# Patient Record
Sex: MALE | Race: BLACK OR AFRICAN AMERICAN | Employment: FULL TIME | URBAN - METROPOLITAN AREA
[De-identification: names, ages, dates, MRNs, and addresses within clinical notes are randomized per-mention and may not be internally consistent; named-entity substitution may affect disease eponyms.]

---

## 2017-05-31 ENCOUNTER — LAB ONLY (OUTPATIENT)
Dept: FAMILY MEDICINE CLINIC | Age: 62
End: 2017-05-31

## 2017-05-31 DIAGNOSIS — E78.1 HYPERTRIGLYCERIDEMIA: ICD-10-CM

## 2017-05-31 DIAGNOSIS — I10 HTN (HYPERTENSION), BENIGN: Primary | ICD-10-CM

## 2017-06-01 LAB
ALT SERPL-CCNC: 14 U/L (ref 5–40)
ANION GAP SERPL CALC-SCNC: 19 MMOL/L
AST SERPL W P-5'-P-CCNC: 22 U/L (ref 10–37)
BUN SERPL-MCNC: 19 MG/DL (ref 6–22)
CALCIUM SERPL-MCNC: 10.2 MG/DL (ref 8.4–10.4)
CHLORIDE SERPL-SCNC: 95 MMOL/L (ref 98–110)
CHOLEST SERPL-MCNC: 173 MG/DL (ref 110–200)
CO2 SERPL-SCNC: 26 MMOL/L (ref 20–32)
CREAT SERPL-MCNC: 1.2 MG/DL (ref 0.8–1.6)
GFRAA, 66117: >60
GFRNA, 66118: 59.5
GLUCOSE SERPL-MCNC: 104 MG/DL (ref 65–99)
HDLC SERPL-MCNC: 38 MG/DL (ref 40–59)
LDLC SERPL CALC-MCNC: 68 MG/DL (ref 50–99)
POTASSIUM SERPL-SCNC: 5.5 MMOL/L (ref 3.5–5.5)
SODIUM SERPL-SCNC: 140 MMOL/L (ref 133–145)
TRIGL SERPL-MCNC: 331 MG/DL (ref 40–149)
VLDLC SERPL CALC-MCNC: 66 MG/DL (ref 8–30)

## 2017-06-07 ENCOUNTER — OFFICE VISIT (OUTPATIENT)
Dept: FAMILY MEDICINE CLINIC | Age: 62
End: 2017-06-07

## 2017-06-07 ENCOUNTER — DOCUMENTATION ONLY (OUTPATIENT)
Dept: FAMILY MEDICINE CLINIC | Age: 62
End: 2017-06-07

## 2017-06-07 VITALS
HEART RATE: 81 BPM | BODY MASS INDEX: 31.23 KG/M2 | RESPIRATION RATE: 16 BRPM | OXYGEN SATURATION: 99 % | TEMPERATURE: 98.3 F | DIASTOLIC BLOOD PRESSURE: 82 MMHG | SYSTOLIC BLOOD PRESSURE: 124 MMHG | HEIGHT: 67 IN | WEIGHT: 199 LBS

## 2017-06-07 DIAGNOSIS — J30.1 SEASONAL ALLERGIC RHINITIS DUE TO POLLEN: ICD-10-CM

## 2017-06-07 DIAGNOSIS — E78.1 HYPERTRIGLYCERIDEMIA: ICD-10-CM

## 2017-06-07 DIAGNOSIS — I10 HTN (HYPERTENSION), BENIGN: Primary | ICD-10-CM

## 2017-06-07 RX ORDER — LORATADINE 10 MG/1
10 TABLET ORAL DAILY
Qty: 30 TAB | Refills: 0 | COMMUNITY
Start: 2017-06-07 | End: 2017-10-24 | Stop reason: ALTCHOICE

## 2017-06-07 RX ORDER — FENOFIBRATE 145 MG/1
TABLET, COATED ORAL
Qty: 90 TAB | Refills: 3 | Status: SHIPPED | OUTPATIENT
Start: 2017-06-07 | End: 2018-06-11 | Stop reason: SDUPTHER

## 2017-06-07 RX ORDER — LOSARTAN POTASSIUM AND HYDROCHLOROTHIAZIDE 25; 100 MG/1; MG/1
TABLET ORAL
Qty: 90 TAB | Refills: 3 | Status: SHIPPED | OUTPATIENT
Start: 2017-06-07 | End: 2018-06-11 | Stop reason: SDUPTHER

## 2017-06-07 NOTE — PROGRESS NOTES
Patient dropped off Hemoccult II Sensa Patient Kit containing 3 test cards dated 06/05/2017, 06/06/2017, and 06/07/2017. All three test cards were negative.

## 2017-06-07 NOTE — PROGRESS NOTES
1. Have you been to the ER, urgent care clinic since your last visit? Hospitalized since your last visit? No    2. Have you seen or consulted any other health care providers outside of the [address] [place] [address] since your last visit? Include any pap smears or colon screening.  No

## 2017-06-07 NOTE — PATIENT INSTRUCTIONS

## 2017-06-07 NOTE — MR AVS SNAPSHOT
Visit Information Date & Time Provider Department Dept. Phone Encounter #  
 6/7/2017  8:00 AM [facility] [id number] [phone] Follow-up Instructions Return in about 4 months (around 10/7/2017) for triglyceride. Your Appointments 11/16/2017  9:00 AM  
ULTRASOUND with Jaqui Foster MD  
[facility] [address] [facility]) Appt Note: PVR/PSA  
 [address] [address] [address]  
[phone] [address] 600 [facility] [address] Upcoming Health Maintenance Date Due ZOSTER VACCINE AGE 60> 1/13/2017 INFLUENZA AGE 9 TO ADULT 8/1/2017 COLONOSCOPY 6/5/2019 DTaP/Tdap/Td series (2 - Td) 7/5/2026 Allergies as of 6/7/2017  Review Complete On: 6/7/2017 By: Timoteo Salazar LPN No Known Allergies Current Immunizations  Never Reviewed Name Date Influenza Vaccine (Quad) PF 12/8/2016, 9/2/2015, 10/1/2014 Influenza Vaccine PF 9/30/2013 Influenza Vaccine Split 10/26/2011 Tdap 7/5/2016 Not reviewed this visit You Were Diagnosed With   
  
 Codes Comments HTN (hypertension), benign    -  Primary ICD-10-CM: I10 
ICD-9-CM: 401.1 Hypertriglyceridemia     ICD-10-CM: E78.1 ICD-9-CM: 272.1 Seasonal allergic rhinitis due to pollen     ICD-10-CM: J30.1 ICD-9-CM: 477.0 Vitals BP Pulse Temp Resp Height(growth percentile) Weight(growth percentile) 124/82 (BP 1 Location: Left arm, BP Patient Position: Sitting) 81 98.3 °F (36.8 °C) (Oral) 16 5' 7\" (1.702 m) 199 lb (90.3 kg) SpO2 BMI Smoking Status 99% 31.17 kg/m2 Never Smoker BMI and BSA Data Body Mass Index Body Surface Area  
 31.17 kg/m 2 2.07 m 2 Preferred Pharmacy Pharmacy Name Phone [id number]  [address], [address] [phone] Your Updated Medication List  
  
   
 This list is accurate as of: 6/7/17  8:27 AM.  Always use your most recent med list.  
  
  
  
  
 aspirin delayed-release 81 mg tablet Take 81 mg by mouth daily. CLARITIN 10 mg tablet Generic drug:  loratadine Take 1 Tab by mouth daily. cyclobenzaprine 5 mg tablet Commonly known as:  FLEXERIL Take 1 Tab by mouth three (3) times daily as needed for Muscle Spasm(s). As directed. fenofibrate nanocrystallized 145 mg tablet Commonly known as:  TRICOR  
take 1 tablet by mouth daily  
  
 ibuprofen 200 mg tablet Commonly known as:  MOTRIN Take  by mouth.  
  
 losartan-hydroCHLOROthiazide 100-25 mg per tablet Commonly known as:  HYZAAR  
take 1 tablet by mouth daily  
  
 timolol 0.5 % ophthalmic gel-forming Commonly known as:  TIMOPTIC-XE  
  
  
  
  
Prescriptions Sent to Pharmacy Refills  
 losartan-hydroCHLOROthiazide (HYZAAR) 100-25 mg per tablet 3 Sig: take 1 tablet by mouth daily Class: Normal  
 Pharmacy: [facility] [address] [place] [address] Ph #: [phone]  
 fenofibrate nanocrystallized (TRICOR) 145 mg tablet 3 Sig: take 1 tablet by mouth daily Class: Normal  
 Pharmacy: [facility] [address] [place] [address] Ph #: [phone] Follow-up Instructions Return in about 4 months (around 10/7/2017) for triglyceride. Patient Instructions Allergies: Care Instructions Your Care Instructions Allergies occur when your body's defense system (immune system) overreacts to certain substances. The immune system treats a harmless substance as if it were a harmful germ or virus. Many things can cause this overreaction, including pollens, medicine, food, dust, animal dander, and mold. Allergies can be mild or severe. Mild allergies can be managed with home treatment. But medicine may be needed to prevent problems. Managing your allergies is an important part of staying healthy.  Your doctor may suggest that you have allergy testing to help find out what is causing your allergies. When you know what things trigger your symptoms, you can avoid them. This can prevent allergy symptoms and other health problems. For severe allergies that cause reactions that affect your whole body (anaphylactic reactions), your doctor may prescribe a shot of epinephrine to carry with you in case you have a severe reaction. Learn how to give yourself the shot and keep it with you at all times. Make sure it is not . Follow-up care is a key part of your treatment and safety. Be sure to make and go to all appointments, and call your doctor if you are having problems. It's also a good idea to know your test results and keep a list of the medicines you take. How can you care for yourself at home? · If you have been told by your doctor that dust or dust mites are causing your allergy, decrease the dust around your bed: 
[facility] AUTHOR[employer] sheets, pillowcases, and other bedding in hot water every week. ¨ Use dust-proof covers for pillows, duvets, and mattresses. Avoid plastic covers because they tear easily and do not \"breathe. \" Wash as instructed on the label. ¨ Do not use any blankets and pillows that you do not need. ¨ Use blankets that you can wash in your washing machine. ¨ Consider removing drapes and carpets, which attract and hold dust, from your bedroom. · If you are allergic to house dust and mites, do not use home humidifiers. Your doctor can suggest ways you can control dust and mites. · Look for signs of cockroaches. Cockroaches cause allergic reactions. Use cockroach baits to get rid of them. Then, clean your home well. Cockroaches like areas where grocery bags, newspapers, empty bottles, or cardboard boxes are stored. Do not keep these inside your home, and keep trash and food containers sealed. Seal off any spots where cockroaches might enter your home. · If you are allergic to mold, get rid of furniture, rugs, and drapes that smell musty. Check for mold in the bathroom. · If you are allergic to outdoor pollen or mold spores, use air-conditioning. Change or clean all filters every month. Keep windows closed. · If you are allergic to pollen, stay inside when pollen counts are high. Use a vacuum  with a HEPA filter or a double-thickness filter at least two times each week. · Stay inside when air pollution is bad. Avoid paint fumes, perfumes, and other strong odors. · Avoid conditions that make your allergies worse. Stay away from smoke. Do not smoke or let anyone else smoke in your house. Do not use fireplaces or wood-burning stoves. · If you are allergic to your pets, change the air filter in your furnace every month. Use high-efficiency filters. · If you are allergic to pet dander, keep pets outside or out of your bedroom. Old carpet and cloth furniture can hold a lot of animal dander. You may need to replace them. When should you call for help? Give an epinephrine shot if: 
· You think you are having a severe allergic reaction. · You have symptoms in more than one body area, such as mild nausea and an itchy mouth. After giving an epinephrine shot call 911, even if you feel better. Call 911 if: 
· You have symptoms of a severe allergic reaction. These may include: 
¨ Sudden raised, red areas (hives) all over your body. ¨ Swelling of the throat, mouth, lips, or tongue. ¨ Trouble breathing. ¨ Passing out (losing consciousness). Or you may feel very lightheaded or suddenly feel weak, confused, or restless. · You have been given an epinephrine shot, even if you feel better. Call your doctor now or seek immediate medical care if: 
· You have symptoms of an allergic reaction, such as: ¨ A rash or hives (raised, red areas on the skin). ¨ Itching. ¨ Swelling. ¨ Belly pain, nausea, or vomiting. Watch closely for changes in your health, and be sure to contact your doctor if: 
· You do not get better as expected. Where can you learn more? Go to http://marsha-porfirio.info/. Enter C168 in the search box to learn more about \"Allergies: Care Instructions. \" Current as of: February 12, 2016 Content Version: 11.2 © 1183-8228 [employer]. Care instructions adapted under license by [employer] (which disclaims liability or warranty for this information). If you have questions about a medical condition or this instruction, always ask your healthcare professional. [address] any warranty or liability for your use of this information. Introducing [facility] & HEALTH SERVICES! Dear Freddy Pittman: Thank you for requesting a GameLayers account. Our records indicate that you already have an active GameLayers account. You can access your account anytime at [email or web address]://"[email or web address] [email or web address]". [employer]/"Tixie [employer]" Did you know that you can access your hospital and ER discharge instructions at any time in GameLayers? You can also review all of your test results from your hospital stay or ER visit. Additional Information If you have questions, please visit the Frequently Asked Questions section of the [employer] website at [email or web address]://"[email or web address] [employer]". [employer]/"T[employer]ie [employer]"/. Remember, GameLayers is NOT to be used for urgent needs. For medical emergencies, dial 911. Now available from your iPhone and Android! Please provide this summary of care documentation to your next provider. Your primary care clinician is listed as [address]. If you have any questions after today's visit, please call [phone].

## 2017-06-07 NOTE — PROGRESS NOTES
HISTORY OF PRESENT ILLNESS  Liliana Justin is a 63 y.o. male. HPI  Patient is here today for evaluation and treatment of: Hypertension/Cholesterol problem    Hypertension: he is on hyzaar;  Needs a refill. BP is stable today. Had a neg CT of head recently. States he at times still has a pain in his head;  Has no worsening sx. 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 States that he has had some dry cough and itchy throat for the last week and  1/2. No fever, + sneezing. Current Outpatient Prescriptions:     ibuprofen (MOTRIN) 200 mg tablet, Take  by mouth., Disp: , Rfl:     losartan-hydrochlorothiazide (HYZAAR) 100-25 mg per tablet, take 1 tablet by mouth daily, Disp: 90 Tab, Rfl: 3    fenofibrate nanocrystallized (TRICOR) 145 mg tablet, take 1 tablet by mouth daily, Disp: 90 Tab, Rfl: 3    timolol (TIMOPTIC-XE) 0.5 % ophthalmic gel-forming, , Disp: , Rfl: 6    aspirin delayed-release 81 mg tablet, Take 81 mg by mouth daily. , Disp: , Rfl:     cyclobenzaprine (FLEXERIL) 5 mg tablet, Take 1 Tab by mouth three (3) times daily as needed for Muscle Spasm(s). As directed., Disp: 30 Tab, Rfl: 0     PMH,  Meds, Allergies, Family History, Social history reviewed      Review of Systems   Constitutional: Negative for chills and fever. Cardiovascular: Negative for chest pain and palpitations. Physical Exam   HENT:   Right Ear: Tympanic membrane normal.   Left Ear: Tympanic membrane normal.   Nose: No mucosal edema. Right sinus exhibits no maxillary sinus tenderness and no frontal sinus tenderness. Left sinus exhibits no maxillary sinus tenderness and no frontal sinus tenderness. Mouth/Throat: No oropharyngeal exudate.       Visit Vitals    /82 (BP 1 Location: Left arm, BP Patient Position: Sitting)    Pulse 81    Temp 98.3 °F (36.8 °C) (Oral)    Resp 16    Ht 5' 7\" (1.702 m)    Wt 199 lb (90.3 kg)    SpO2 99%    BMI 31.17 kg/m2     General appearance: alert, cooperative, no distress, appears stated age  Neck: supple, symmetrical, trachea midline, no adenopathy, thyroid: not enlarged, symmetric, no tenderness/mass/nodules, no carotid bruit and no JVD  Lungs: clear to auscultation bilaterally  Heart: regular rate and rhythm, S1, S2 normal, no murmur, click, rub or gallop  Extremities: extremities normal, atraumatic, no cyanosis or edema  Lab Results   Component Value Date/Time    Cholesterol, total 173 05/31/2017 09:51 AM    HDL Cholesterol 38 05/31/2017 09:51 AM    LDL, calculated 68 05/31/2017 09:51 AM    VLDL, calculated 66 05/31/2017 09:51 AM    Triglyceride 331 05/31/2017 09:51 AM    CHOL/HDL Ratio 4.1 08/04/2010 04:45 PM     Lab Results   Component Value Date/Time    Sodium 140 05/31/2017 09:51 AM    Potassium 5.5 05/31/2017 09:51 AM    Chloride 95 05/31/2017 09:51 AM    CO2 26 05/31/2017 09:51 AM    Anion gap 19.0 05/31/2017 09:51 AM    Glucose 104 05/31/2017 09:51 AM    BUN 19 05/31/2017 09:51 AM    Creatinine 1.2 05/31/2017 09:51 AM    BUN/Creatinine ratio 15 10/26/2011 10:28 AM    GFR est AA 76 10/26/2011 10:28 AM    GFR est non-AA 66 10/26/2011 10:28 AM    Calcium 10.2 05/31/2017 09:51 AM         ASSESSMENT and PLAN    ICD-10-CM ICD-9-CM    1. HTN (hypertension), benign I10 401.1    2. Hypertriglyceridemia E78.1 272.1    3. Seasonal allergic rhinitis due to pollen J30.1 477.0 loratadine (CLARITIN) 10 mg tablet       As above,   above all stable unless otherwise noted  Labs as ordered  Continue current meds as ordered  Advised claritin OTC as per record for probable allergic rhinitis. Follow-up Disposition:  Return in about 4 months (around 10/7/2017) for triglyceride. An After Visit Summary was printed and given to the patient. This has been fully explained to the patient, who indicates understanding.

## 2017-10-24 ENCOUNTER — OFFICE VISIT (OUTPATIENT)
Dept: FAMILY MEDICINE CLINIC | Age: 62
End: 2017-10-24

## 2017-10-24 VITALS
BODY MASS INDEX: 31.08 KG/M2 | WEIGHT: 198 LBS | OXYGEN SATURATION: 95 % | TEMPERATURE: 98.6 F | HEART RATE: 72 BPM | DIASTOLIC BLOOD PRESSURE: 84 MMHG | RESPIRATION RATE: 18 BRPM | HEIGHT: 67 IN | SYSTOLIC BLOOD PRESSURE: 130 MMHG

## 2017-10-24 DIAGNOSIS — I10 ESSENTIAL HYPERTENSION: Primary | ICD-10-CM

## 2017-10-24 DIAGNOSIS — Z23 ENCOUNTER FOR IMMUNIZATION: ICD-10-CM

## 2017-10-24 DIAGNOSIS — E78.1 HYPERTRIGLYCERIDEMIA: ICD-10-CM

## 2017-10-24 NOTE — MR AVS SNAPSHOT
Visit Information Date & Time Provider Department Dept. Phone Encounter #  
 10/24/2017  8:40 AM Joey Wyatt, [address] [place] [address] [place] [facility] [id number] Follow-up Instructions Return in about 6 months (around 4/24/2018) for physical.  
  
Your Appointments 11/16/2017  9:00 AM  
ULTRASOUND with Todd Jennings, MD  
[facility] [address] [facility]) Appt Note: PVR/PSA  
 [address] [place] [address] [address] [phone] [address] 600 [facility] [address] Upcoming Health Maintenance Date Due ZOSTER VACCINE AGE 60> 11/13/2016 INFLUENZA AGE 9 TO ADULT 8/1/2017 COLONOSCOPY 6/5/2019 DTaP/Tdap/Td series (2 - Td) 7/5/2026 Allergies as of 10/24/2017  Review Complete On: 10/24/2017 By: Eddie Carrion LPN No Known Allergies Current Immunizations  Never Reviewed Name Date Influenza Vaccine (Quad) PF 10/24/2017, 12/8/2016, 9/2/2015, 10/1/2014 Influenza Vaccine PF 9/30/2013 Influenza Vaccine Split 10/26/2011 Tdap 7/5/2016 Not reviewed this visit You Were Diagnosed With   
  
 Codes Comments Essential hypertension    -  Primary ICD-10-CM: I10 
ICD-9-CM: 401.9 Hypertriglyceridemia     ICD-10-CM: E78.1 ICD-9-CM: 272.1 Encounter for immunization     ICD-10-CM: G42 ICD-9-CM: V03.89 Vitals BP Pulse Temp Resp Height(growth percentile) Weight(growth percentile) 130/84 (BP 1 Location: Left arm, BP Patient Position: Sitting) 72 98.6 °F (37 °C) (Oral) 18 5' 7\" (1.702 m) 198 lb (89.8 kg) SpO2 BMI Smoking Status 95% 31.01 kg/m2 Never Smoker Vitals History BMI and BSA Data Body Mass Index Body Surface Area 31.01 kg/m 2 2.06 m 2 Preferred Pharmacy Pharmacy Name Phone [id number], [address] [place] [address] Ave [phone] Your Updated Medication List  
  
   
 This list is accurate as of: 10/24/17  9:36 AM.  Always use your most recent med list.  
  
  
  
  
 aspirin delayed-release 81 mg tablet Take 81 mg by mouth daily. fenofibrate nanocrystallized 145 mg tablet Commonly known as:  TRICOR  
take 1 tablet by mouth daily  
  
 losartan-hydroCHLOROthiazide 100-25 mg per tablet Commonly known as:  HYZAAR  
take 1 tablet by mouth daily  
  
 timolol 0.5 % ophthalmic gel-forming Commonly known as:  TIMOPTIC-XE We Performed the Following INFLUENZA VIRUS VAC QUAD,SPLIT,PRESV FREE SYRINGE IM [id number] CPT(R)] Follow-up Instructions Return in about 6 months (around 4/24/2018) for physical.  
  
To-Do List   
 10/31/2017 Lab:  ALT   
  
 10/31/2017 Lab:  AST   
  
 10/31/2017 Lab:  LIPID PANEL   
  
 10/31/2017 Lab:  METABOLIC PANEL, BASIC Patient Instructions High Cholesterol: Care Instructions Your Care Instructions Cholesterol is a type of fat in your blood. It is needed for many body functions, such as making new cells. Cholesterol is made by your body. It also comes from food you eat. High cholesterol means that you have too much of the fat in your blood. This raises your risk of a heart attack and stroke. LDL and HDL are part of your total cholesterol. LDL is the \"bad\" cholesterol. High LDL can raise your risk for heart disease, heart attack, and stroke. HDL is the \"good\" cholesterol. It helps clear bad cholesterol from the body. High HDL is linked with a lower risk of heart disease, heart attack, and stroke. Your cholesterol levels help your doctor find out your risk for having a heart attack or stroke. You and your doctor can talk about whether you need to lower your risk and what treatment is best for you. A heart-healthy lifestyle along with medicines can help lower your cholesterol and your risk.  The way you choose to lower your risk will depend on how high your risk is for heart attack and stroke. It will also depend on how you feel about taking medicines. Follow-up care is a key part of your treatment and safety. Be sure to make and go to all appointments, and call your doctor if you are having problems. It's also a good idea to know your test results and keep a list of the medicines you take. How can you care for yourself at home? · Eat a variety of foods every day. Good choices include fruits, vegetables, whole grains (like oatmeal), dried beans and peas, nuts and seeds, soy products (like tofu), and fat-free or low-fat dairy products. · Replace butter, margarine, and hydrogenated or partially hydrogenated oils with olive and canola oils. (Canola oil margarine without trans fat is fine.) · Replace red meat with fish, poultry, and soy protein (like tofu). · Limit processed and packaged foods like chips, crackers, and cookies. · Bake, broil, or steam foods. Don't calvert them. · Be physically active. Get at least 30 minutes of exercise on most days of the week. Walking is a good choice. You also may want to do other activities, such as running, swimming, cycling, or playing tennis or team sports. · Stay at a healthy weight or lose weight by making the changes in eating and physical activity listed above. Losing just a small amount of weight, even 5 to 10 pounds, can reduce your risk for having a heart attack or stroke. · Do not smoke. When should you call for help? Watch closely for changes in your health, and be sure to contact your doctor if: 
· You need help making lifestyle changes. · You have questions about your medicine. Where can you learn more? Go to http://marsha-porfirio.info/. Enter Y197 in the search box to learn more about \"High Cholesterol: Care Instructions. \" Current as of: April 3, 2017 Content Version: 11.3 © 7936-3921 [employer] ([employer].  Care instructions adapted under license by [address] (which disclaims liability or warranty for this information). If you have questions about a medical condition or this instruction, always ask your healthcare professional. [address] any warranty or liability for your use of this information. Introducing [facility] & HEALTH SERVICES! Dear Joe Barrientos: Thank you for requesting a [employer] account. Our records indicate that you already have an active [employer] account. You can access your account anytime at [email or web address] Did you know that you can access your hospital and ER discharge instructions at any time in [employer]? You can also review all of your test results from your hospital stay or ER visit. Additional Information If you have questions, please visit the Frequently Asked Questions section of the [employer] website at [email or web address] Remember, [employer] is NOT to be used for urgent needs. For medical emergencies, dial 911. Now available from your iPhone and Android! Please provide this summary of care documentation to your next provider. Your primary care clinician is listed as [address]. If you have any questions after today's visit, please call [phone].

## 2017-10-24 NOTE — PROGRESS NOTES
1. Have you been to the ER, urgent care clinic since your last visit? Hospitalized since your last visit? No    2. Have you seen or consulted any other health care providers outside of the [address] [place] [address] since your last visit? Include any pap smears or colon screening.  No

## 2017-10-24 NOTE — PATIENT INSTRUCTIONS

## 2017-10-31 DIAGNOSIS — E78.1 HYPERTRIGLYCERIDEMIA: ICD-10-CM

## 2017-10-31 DIAGNOSIS — I10 ESSENTIAL HYPERTENSION: ICD-10-CM

## 2018-04-04 LAB
ALT SERPL-CCNC: 13 U/L (ref 5–40)
ANION GAP SERPL CALC-SCNC: 17 MMOL/L
AST SERPL W P-5'-P-CCNC: 26 U/L (ref 10–37)
BUN SERPL-MCNC: 15 MG/DL (ref 6–22)
CALCIUM SERPL-MCNC: 9.9 MG/DL (ref 8.4–10.4)
CHLORIDE SERPL-SCNC: 93 MMOL/L (ref 98–110)
CHOLEST SERPL-MCNC: 188 MG/DL (ref 110–200)
CO2 SERPL-SCNC: 27 MMOL/L (ref 20–32)
CREAT SERPL-MCNC: 1.1 MG/DL (ref 0.8–1.6)
GFRAA, 66117: >60
GFRNA, 66118: >60
GLUCOSE SERPL-MCNC: 110 MG/DL (ref 70–99)
HDLC SERPL-MCNC: 3.8 MG/DL (ref 0–5)
HDLC SERPL-MCNC: 49 MG/DL (ref 40–59)
LDLC SERPL CALC-MCNC: 87 MG/DL (ref 50–99)
POTASSIUM SERPL-SCNC: 4.6 MMOL/L (ref 3.5–5.5)
SODIUM SERPL-SCNC: 137 MMOL/L (ref 133–145)
TRIGL SERPL-MCNC: 262 MG/DL (ref 40–149)
VLDLC SERPL CALC-MCNC: 52 MG/DL (ref 8–30)

## 2018-04-24 ENCOUNTER — OFFICE VISIT (OUTPATIENT)
Dept: FAMILY MEDICINE CLINIC | Age: 63
End: 2018-04-24

## 2018-04-24 VITALS
TEMPERATURE: 98.5 F | SYSTOLIC BLOOD PRESSURE: 139 MMHG | HEART RATE: 90 BPM | DIASTOLIC BLOOD PRESSURE: 79 MMHG | OXYGEN SATURATION: 98 % | BODY MASS INDEX: 31.08 KG/M2 | RESPIRATION RATE: 16 BRPM | WEIGHT: 198 LBS | HEIGHT: 67 IN

## 2018-04-24 DIAGNOSIS — Z00.00 ROUTINE MEDICAL EXAM: Primary | ICD-10-CM

## 2018-04-24 NOTE — MR AVS SNAPSHOT
[address] [facility] 
 
 
 [address] [address] [address] [facility] [address] 
[phone] Patient: Kevin Jones MRN:  [id number] Visit Information Date & Time Provider Department Dept. Phone Encounter #  
 4/24/2018  8:20 AM Adalgisa Maciel, [address] [place] [address] [phone] [id number] Follow-up Instructions Return in about 4 months (around 8/24/2018) for sugar. Upcoming Health Maintenance Date Due ZOSTER VACCINE AGE 60> 8/16/2018* DTaP/Tdap/Td series (2 - Td) 7/5/2026 *Topic was postponed. The date shown is not the original due date. Allergies as of 4/24/2018  Review Complete On: 4/24/2018 By: Adalgisa Maciel MD  
 No Known Allergies Current Immunizations  Never Reviewed Name Date Influenza Vaccine (Quad) PF 10/24/2017, 12/8/2016, 9/2/2015, 10/1/2014 Influenza Vaccine PF 9/30/2013 Influenza Vaccine Split 10/26/2011 Tdap 7/5/2016 Not reviewed this visit You Were Diagnosed With   
  
 Codes Comments Routine medical exam    -  Primary ICD-10-CM: Z00.00 ICD-9-CM: V70.0 Vitals BP Pulse Temp Resp Height(growth percentile) Weight(growth percentile) 139/79 (BP 1 Location: Left arm, BP Patient Position: Sitting) 90 98.5 °F (36.9 °C) (Oral) 16 5' 7\" (1.702 m) 198 lb (89.8 kg) SpO2 BMI Smoking Status 98% 31.01 kg/m2 Never Smoker BMI and BSA Data Body Mass Index Body Surface Area 31.01 kg/m 2 2.06 m 2 Preferred Pharmacy Pharmacy Name Phone [id number], [facility] [phone] Your Updated Medication List  
  
   
This list is accurate as of 4/24/18  9:10 AM.  Always use your most recent med list.  
  
  
  
  
 aspirin delayed-release 81 mg tablet Take 81 mg by mouth daily. fenofibrate nanocrystallized 145 mg tablet Commonly known as:  TRICOR  
take 1 tablet by mouth daily losartan-hydroCHLOROthiazide 100-25 mg per tablet Commonly known as:  HYZAAR  
take 1 tablet by mouth daily  
  
 timolol 0.5 % ophthalmic gel-forming Commonly known as:  TIMOPTIC-XE Follow-up Instructions Return in about 4 months (around 8/24/2018) for sugar. Patient Instructions Well Visit, Men 48 to 72: Care Instructions Your Care Instructions Physical exams can help you stay healthy. Your doctor has checked your overall health and may have suggested ways to take good care of yourself. He or she also may have recommended tests. At home, you can help prevent illness with healthy eating, regular exercise, and other steps. Follow-up care is a key part of your treatment and safety. Be sure to make and go to all appointments, and call your doctor if you are having problems. It's also a good idea to know your test results and keep a list of the medicines you take. How can you care for yourself at home? · Reach and stay at a healthy weight. This will lower your risk for many problems, such as obesity, diabetes, heart disease, and high blood pressure. · Get at least 30 minutes of exercise on most days of the week. Walking is a good choice. You also may want to do other activities, such as running, swimming, cycling, or playing tennis or team sports. · Do not smoke. Smoking can make health problems worse. If you need help quitting, talk to your doctor about stop-smoking programs and medicines. These can increase your chances of quitting for good. · Protect your skin from too much sun. When you're outdoors from 10 a.m. to 4 p.m., stay in the shade or cover up with clothing and a hat with a wide brim. Wear sunglasses that block UV rays. Even when it's cloudy, put broad-spectrum sunscreen (SPF 30 or higher) on any exposed skin. · See a dentist one or two times a year for checkups and to have your teeth cleaned. · Wear a seat belt in the car. · Limit alcohol to 2 drinks a day. Too much alcohol can cause health problems. Follow your doctor's advice about when to have certain tests. These tests can spot problems early. · Cholesterol. Your doctor will tell you how often to have this done based on your overall health and other things that can increase your risk for heart attack and stroke. · Blood pressure. Have your blood pressure checked during a routine doctor visit. Your doctor will tell you how often to check your blood pressure based on your age, your blood pressure results, and other factors. · Prostate exam. Talk to your doctor about whether you should have a blood test (called a PSA test) for prostate cancer. Experts disagree on whether men should have this test. Some experts recommend that you discuss the benefits and risks of the test with your doctor. · Diabetes. Ask your doctor whether you should have tests for diabetes. · Vision. Some experts recommend that you have yearly exams for glaucoma and other age-related eye problems starting at age 48. · Hearing. Tell your doctor if you notice any change in your hearing. You can have tests to find out how well you hear. · Colon cancer. You should begin tests for colon cancer at age 48. You may have one of several tests. Your doctor will tell you how often to have tests based on your age and risk. Risks include whether you already had a precancerous polyp removed from your colon or whether your parent, brother, sister, or child has had colon cancer. · Heart attack and stroke risk. At least every 4 to 6 years, you should have your risk for heart attack and stroke assessed. Your doctor uses factors such as your age, blood pressure, cholesterol, and whether you smoke or have diabetes to show what your risk for a heart attack or stroke is over the next 10 years. · Abdominal aortic aneurysm.  Ask your doctor whether you should have a test to check for an aneurysm. You may need a test if you ever smoked or if your parent, brother, sister, or child has had an aneurysm. When should you call for help? Watch closely for changes in your health, and be sure to contact your doctor if you have any problems or symptoms that concern you. Where can you learn more? Go to http://marsha-porfirio.info/. Enter T205 in the search box to learn more about \"Well Visit, Men 48 to 72: Care Instructions. \" Current as of: May 12, 2017 Content Version: 11.4 © 1384-4327 [employer]. Care instructions adapted under license by [employer] (which disclaims liability or warranty for this information). If you have questions about a medical condition or this instruction, always ask your healthcare professional. [address] any warranty or liability for your use of this information. Body Mass Index: Care Instructions Your Care Instructions Body mass index (BMI) can help you see if your weight is raising your risk for health problems. It uses a formula to compare how much you weigh with how tall you are. · A BMI lower than 18.5 is considered underweight. · A BMI between 18.5 and 24.9 is considered healthy. · A BMI between 25 and 29.9 is considered overweight. A BMI of 30 or higher is considered obese. If your BMI is in the normal range, it means that you have a lower risk for weight-related health problems. If your BMI is in the overweight or obese range, you may be at increased risk for weight-related health problems, such as high blood pressure, heart disease, stroke, arthritis or joint pain, and diabetes. If your BMI is in the underweight range, you may be at increased risk for health problems such as fatigue, lower protection (immunity) against illness, muscle loss, bone loss, hair loss, and hormone problems. BMI is just one measure of your risk for weight-related health problems. You may be at higher risk for health problems if you are not active, you eat an unhealthy diet, or you drink too much alcohol or use tobacco products. Follow-up care is a key part of your treatment and safety. Be sure to make and go to all appointments, and call your doctor if you are having problems. It's also a good idea to know your test results and keep a list of the medicines you take. How can you care for yourself at home? · Practice healthy eating habits. This includes eating plenty of fruits, vegetables, whole grains, lean protein, and low-fat dairy. · If your doctor recommends it, get more exercise. Walking is a good choice. Bit by bit, increase the amount you walk every day. Try for at least 30 minutes on most days of the week. · Do not smoke. Smoking can increase your risk for health problems. If you need help quitting, talk to your doctor about stop-smoking programs and medicines. These can increase your chances of quitting for good. · Limit alcohol to 2 drinks a day for men and 1 drink a day for women. Too much alcohol can cause health problems. If you have a BMI higher than 25 · Your doctor may do other tests to check your risk for weight-related health problems. This may include measuring the distance around your waist. A waist measurement of more than 40 inches in men or 35 inches in women can increase the risk of weight-related health problems. · Talk with your doctor about steps you can take to stay healthy or improve your health. You may need to make lifestyle changes to lose weight and stay healthy, such as changing your diet and getting regular exercise. If you have a BMI lower than 18.5 · Your doctor may do other tests to check your risk for health problems. · Talk with your doctor about steps you can take to stay healthy or improve your health.  You may need to make lifestyle changes to gain or maintain weight and stay healthy, such as getting more healthy foods in your diet and doing exercises to build muscle. Where can you learn more? Go to http://marsha-porfirio.info/. Enter S176 in the search box to learn more about \"Body Mass Index: Care Instructions. \" Current as of: October 13, 2016 Content Version: 11.4 © 8244-8752 [employer]. Care instructions adapted under license by [email or web address] (which disclaims liability or warranty for this information). If you have questions about a medical condition or this instruction, always ask your healthcare professional. [address] any warranty or liability for your use of this information. Introducing \[facility] [place] [facility]\"" & HEALTH SERVICES! Dear Shahid Cox: Thank you for requesting a [employer] account. Our records indicate that you already have an active Ass[employer] account. You can access your account anytime at [email or web address] Did you know that you can access your hospital and ER discharge instructions at any time in Assistera? You can also review all of your test results from your hospital stay or ER visit. Additional Information If you have questions, please visit the Frequently Asked Questions section of the [employer] website at [email or web address] Remember, Ass[employer]a is NOT to be used for urgent needs. For medical emergencies, dial 911. Now available from your iPhone and Android! Please provide this summary of care documentation to your next provider. Your primary care clinician is listed as [address]. If you have any questions after today's visit, please call [phone].

## 2018-04-24 NOTE — PROGRESS NOTES
1. Have you been to the ER, urgent care clinic since your last visit? Hospitalized since your last visit? No    2. Have you seen or consulted any other health care providers outside of the [address] [place] [address] since your last visit? Include any pap smears or colon screening.  No

## 2018-04-24 NOTE — ACP (ADVANCE CARE PLANNING)
Advance Care Planning (ACP) Provider Conversation Snapshot    Date of ACP Conversation: 04/24/18  Persons included in Conversation:  patient  Length of ACP Conversation in minutes:  <16 minutes (Non-Billable)    Authorized Decision Maker (if patient is incapable of making informed decisions):    This person is:   NA          For Patients with Decision Making Capacity:   TBD    Conversation Outcomes / Follow-Up Plan:   Recommended completion of Advance Directive form after review of ACP materials and conversation with prospective healthcare agent   Recommended communicating the plan and making copies for the healthcare agent, personal physician, and others as appropriate (e.g., health system)

## 2018-04-24 NOTE — PATIENT INSTRUCTIONS
Well Visit, Men 48 to 72: Care Instructions  Your Care Instructions    Physical exams can help you stay healthy. Your doctor has checked your overall health and may have suggested ways to take good care of yourself. He or she also may have recommended tests. At home, you can help prevent illness with healthy eating, regular exercise, and other steps. Follow-up care is a key part of your treatment and safety. Be sure to make and go to all appointments, and call your doctor if you are having problems. It's also a good idea to know your test results and keep a list of the medicines you take. How can you care for yourself at home? · Reach and stay at a healthy weight. This will lower your risk for many problems, such as obesity, diabetes, heart disease, and high blood pressure. · Get at least 30 minutes of exercise on most days of the week. Walking is a good choice. You also may want to do other activities, such as running, swimming, cycling, or playing tennis or team sports. · Do not smoke. Smoking can make health problems worse. If you need help quitting, talk to your doctor about stop-smoking programs and medicines. These can increase your chances of quitting for good. · Protect your skin from too much sun. When you're outdoors from 10 a.m. to 4 p.m., stay in the shade or cover up with clothing and a hat with a wide brim. Wear sunglasses that block UV rays. Even when it's cloudy, put broad-spectrum sunscreen (SPF 30 or higher) on any exposed skin. · See a dentist one or two times a year for checkups and to have your teeth cleaned. · Wear a seat belt in the car. · Limit alcohol to 2 drinks a day. Too much alcohol can cause health problems. Follow your doctor's advice about when to have certain tests. These tests can spot problems early. · Cholesterol.  Your doctor will tell you how often to have this done based on your overall health and other things that can increase your risk for heart attack and stroke. · Blood pressure. Have your blood pressure checked during a routine doctor visit. Your doctor will tell you how often to check your blood pressure based on your age, your blood pressure results, and other factors. · Prostate exam. Talk to your doctor about whether you should have a blood test (called a PSA test) for prostate cancer. Experts disagree on whether men should have this test. Some experts recommend that you discuss the benefits and risks of the test with your doctor. · Diabetes. Ask your doctor whether you should have tests for diabetes. · Vision. Some experts recommend that you have yearly exams for glaucoma and other age-related eye problems starting at age 48. · Hearing. Tell your doctor if you notice any change in your hearing. You can have tests to find out how well you hear. · Colon cancer. You should begin tests for colon cancer at age 48. You may have one of several tests. Your doctor will tell you how often to have tests based on your age and risk. Risks include whether you already had a precancerous polyp removed from your colon or whether your parent, brother, sister, or child has had colon cancer. · Heart attack and stroke risk. At least every 4 to 6 years, you should have your risk for heart attack and stroke assessed. Your doctor uses factors such as your age, blood pressure, cholesterol, and whether you smoke or have diabetes to show what your risk for a heart attack or stroke is over the next 10 years. · Abdominal aortic aneurysm. Ask your doctor whether you should have a test to check for an aneurysm. You may need a test if you ever smoked or if your parent, brother, sister, or child has had an aneurysm. When should you call for help? Watch closely for changes in your health, and be sure to contact your doctor if you have any problems or symptoms that concern you. Where can you learn more? Go to http://marsha-porfirio.info/.   Enter L337 in the search box to learn more about \"Well Visit, Men 48 to 72: Care Instructions. \"  Current as of: May 12, 2017  Content Version: 11.4  © 9156-9488 [employer]. Care instructions adapted under license by [employer] (which disclaims liability or warranty for this information). If you have questions about a medical condition or this instruction, always ask your healthcare professional. [address] any warranty or liability for your use of this information. Body Mass Index: Care Instructions  Your Care Instructions    Body mass index (BMI) can help you see if your weight is raising your risk for health problems. It uses a formula to compare how much you weigh with how tall you are. · A BMI lower than 18.5 is considered underweight. · A BMI between 18.5 and 24.9 is considered healthy. · A BMI between 25 and 29.9 is considered overweight. A BMI of 30 or higher is considered obese. If your BMI is in the normal range, it means that you have a lower risk for weight-related health problems. If your BMI is in the overweight or obese range, you may be at increased risk for weight-related health problems, such as high blood pressure, heart disease, stroke, arthritis or joint pain, and diabetes. If your BMI is in the underweight range, you may be at increased risk for health problems such as fatigue, lower protection (immunity) against illness, muscle loss, bone loss, hair loss, and hormone problems. BMI is just one measure of your risk for weight-related health problems. You may be at higher risk for health problems if you are not active, you eat an unhealthy diet, or you drink too much alcohol or use tobacco products. Follow-up care is a key part of your treatment and safety. Be sure to make and go to all appointments, and call your doctor if you are having problems. It's also a good idea to know your test results and keep a list of the medicines you take.   How can you care for yourself at home? · Practice healthy eating habits. This includes eating plenty of fruits, vegetables, whole grains, lean protein, and low-fat dairy. · If your doctor recommends it, get more exercise. Walking is a good choice. Bit by bit, increase the amount you walk every day. Try for at least 30 minutes on most days of the week. · Do not smoke. Smoking can increase your risk for health problems. If you need help quitting, talk to your doctor about stop-smoking programs and medicines. These can increase your chances of quitting for good. · Limit alcohol to 2 drinks a day for men and 1 drink a day for women. Too much alcohol can cause health problems. If you have a BMI higher than 25  · Your doctor may do other tests to check your risk for weight-related health problems. This may include measuring the distance around your waist. A waist measurement of more than 40 inches in men or 35 inches in women can increase the risk of weight-related health problems. · Talk with your doctor about steps you can take to stay healthy or improve your health. You may need to make lifestyle changes to lose weight and stay healthy, such as changing your diet and getting regular exercise. If you have a BMI lower than 18.5  · Your doctor may do other tests to check your risk for health problems. · Talk with your doctor about steps you can take to stay healthy or improve your health. You may need to make lifestyle changes to gain or maintain weight and stay healthy, such as getting more healthy foods in your diet and doing exercises to build muscle. Where can you learn more? Go to http://marsha-porfirio.info/. Enter S176 in the search box to learn more about \"Body Mass Index: Care Instructions. \"  Current as of: October 13, 2016  Content Version: 11.4  © 5793-3628 [employer]wise[employer].  Care instructions adapted under license by [employer] (which disclaims liability or warranty for this information). If you have questions about a medical condition or this instruction, always ask your healthcare professional. [address] [address] any warranty or liability for your use of this information.

## 2018-04-24 NOTE — PROGRESS NOTES
Subjective:     Juliette Henning is a 66 y.o. male presenting for annual exam and complete physical.    States that his left ear feels clogged often    Also states he gets a sharp pain at times at the left temple region; Had a neg Head CT in 2016. Missed urology appointment in November ; plans to reschedule. Patient Active Problem List    Diagnosis Date Noted    Unspecified hyperplasia of prostate with urinary obstruction and other lower urinary tract symptoms (LUTS)     HTN (hypertension), benign 01/11/2010    Allergic rhinitis 01/11/2010    Glaucoma 01/11/2010    Hypertriglyceridemia 01/11/2010     Current Outpatient Prescriptions   Medication Sig Dispense Refill    losartan-hydroCHLOROthiazide (HYZAAR) 100-25 mg per tablet take 1 tablet by mouth daily 90 Tab 3    fenofibrate nanocrystallized (TRICOR) 145 mg tablet take 1 tablet by mouth daily 90 Tab 3    timolol (TIMOPTIC-XE) 0.5 % ophthalmic gel-forming   6    aspirin delayed-release 81 mg tablet Take 81 mg by mouth daily.        No Known Allergies  Past Medical History:   Diagnosis Date    Allergic rhinitis     environmental    Allergic rhinitis 1/11/2010    Arthritis     neck, shoulder/ chiropractor    Essential hypertension, benign     Glaucoma 1/11/2010    Glaucoma 1/11/2010    HTN (hypertension), benign 1/11/2010    Hypertriglyceridemia      Past Surgical History:   Procedure Laterality Date    COLONOSCOPY  11/07    due Nov 2012    HX HERNIA REPAIR  1975    left groin     Family History   Problem Relation Age of Onset    Diabetes Mother     Cancer Mother      bone ca    Diabetes Father      Social History   Substance Use Topics    Smoking status: Never Smoker    Smokeless tobacco: Never Used    Alcohol use Yes      Comment: occ beer        Lab Results   Component Value Date/Time    WBC 6.9 01/25/2011 08:25 AM    HGB 14.2 01/25/2011 08:25 AM    HCT 42.5 01/25/2011 08:25 AM    PLATELET 707 65/88/9576 08:25 AM    MCV 91 01/25/2011 08:25 AM     Lab Results   Component Value Date/Time    Cholesterol, total 188 04/03/2018 08:10 AM    HDL Cholesterol 49 04/03/2018 08:10 AM    LDL, calculated 87 04/03/2018 08:10 AM    Triglyceride 262 (H) 04/03/2018 08:10 AM    CHOL/HDL Ratio 4.1 08/04/2010 04:45 PM     Lab Results   Component Value Date/Time    ALT (SGPT) 13 04/03/2018 08:10 AM    AST (SGOT) 26 04/03/2018 08:10 AM    Alk. phosphatase 46 01/25/2011 08:25 AM    Bilirubin, total 0.3 01/25/2011 08:25 AM    Albumin 4.4 01/25/2011 08:25 AM    Protein, total 6.9 01/25/2011 08:25 AM    PLATELET 795 63/85/6122 08:25 AM       Lab Results   Component Value Date/Time    GFR est non-AA 66 10/26/2011 10:28 AM    GFR est AA 76 10/26/2011 10:28 AM    Creatinine 1.1 04/03/2018 08:10 AM    BUN 15 04/03/2018 08:10 AM    Sodium 137 04/03/2018 08:10 AM    Potassium 4.6 04/03/2018 08:10 AM    Chloride 93 (L) 04/03/2018 08:10 AM    CO2 27 04/03/2018 08:10 AM     Lab Results   Component Value Date/Time    Prostate Specific Ag 0.576 11/17/2016 09:25 AM    Prostate Specific Ag 0.781 11/17/2015 09:00 AM    Prostate Specific Ag 0.6 11/15/2011 12:55 PM    Prostate Specific Ag 0.6 05/05/2010 08:09 AM    Prostate Specific Ag 0.5 01/11/2010 10:03 AM        Review of Systems  A comprehensive review of systems was negative except for that written in the HPI.     Objective:     Visit Vitals    /79 (BP 1 Location: Left arm, BP Patient Position: Sitting)    Pulse 90    Temp 98.5 °F (36.9 °C) (Oral)    Resp 16    Ht 5' 7\" (1.702 m)    Wt 198 lb (89.8 kg)    SpO2 98%    BMI 31.01 kg/m2     Physical exam:   General appearance - alert, well appearing, and in no distress  Ears - bilateral TM's and external ear ; left canal normal; right ear canal obscured with cerumen  Nose - normal and patent, no erythema, discharge or polyps  Mouth - mucous membranes moist, pharynx normal without lesions  Neck - supple, no significant adenopathy  Lymphatics - no palpable lymphadenopathy, no hepatosplenomegaly  Chest - clear to auscultation, no wheezes, rales or rhonchi, symmetric air entry  Heart - normal rate, regular rhythm, normal S1, S2, no murmurs, rubs, clicks or gallops  Abdomen - soft, nontender, nondistended, no masses or organomegaly  Musculoskeletal - no joint tenderness, deformity or swelling  Extremities - no pedal edema noted  Skin - normal coloration and turgor, no rashes, no suspicious skin lesions noted     Lab Results   Component Value Date/Time    Cholesterol, total 188 04/03/2018 08:10 AM    HDL Cholesterol 49 04/03/2018 08:10 AM    LDL, calculated 87 04/03/2018 08:10 AM    VLDL, calculated 52 (H) 04/03/2018 08:10 AM    Triglyceride 262 (H) 04/03/2018 08:10 AM    CHOL/HDL Ratio 4.1 08/04/2010 04:45 PM     Lab Results   Component Value Date/Time    Cholesterol, total 188 04/03/2018 08:10 AM    HDL Cholesterol 49 04/03/2018 08:10 AM    LDL, calculated 87 04/03/2018 08:10 AM    VLDL, calculated 52 (H) 04/03/2018 08:10 AM    Triglyceride 262 (H) 04/03/2018 08:10 AM    CHOL/HDL Ratio 4.1 08/04/2010 04:45 PM     Lab Results   Component Value Date/Time    Sodium 137 04/03/2018 08:10 AM    Potassium 4.6 04/03/2018 08:10 AM    Chloride 93 (L) 04/03/2018 08:10 AM    CO2 27 04/03/2018 08:10 AM    Anion gap 17.0 04/03/2018 08:10 AM    Glucose 110 (H) 04/03/2018 08:10 AM    BUN 15 04/03/2018 08:10 AM    Creatinine 1.1 04/03/2018 08:10 AM    BUN/Creatinine ratio 15 10/26/2011 10:28 AM    GFR est AA 76 10/26/2011 10:28 AM    GFR est non-AA 66 10/26/2011 10:28 AM    Calcium 9.9 04/03/2018 08:10 AM    Bilirubin, total 0.3 01/25/2011 08:25 AM    AST (SGOT) 26 04/03/2018 08:10 AM    Alk.  phosphatase 46 01/25/2011 08:25 AM    Protein, total 6.9 01/25/2011 08:25 AM    Albumin 4.4 01/25/2011 08:25 AM    Globulin 3.3 08/04/2010 04:45 PM    A-G Ratio 1.8 01/25/2011 08:25 AM    ALT (SGPT) 13 04/03/2018 08:10 AM     Lab Results   Component Value Date/Time    Hemoglobin A1c 5.5 03/02/2015 08:58 AM     Lab Results   Component Value Date/Time    Prostate Specific Ag 0.576 11/17/2016 09:25 AM    Prostate Specific Ag 0.781 11/17/2015 09:00 AM    Prostate Specific Ag 0.6 11/15/2011 12:55 PM    Prostate Specific Ag 0.6 05/05/2010 08:09 AM    Prostate Specific Ag 0.5 01/11/2010 10:03 AM           Assessment/Plan:       increase physical activity, follow low fat diet, follow low salt diet, continue present plan, routine labs ordered. ICD-10-CM ICD-9-CM    1. Routine medical exam Z00.00 V70.0    . As above,   above all stable unless otherwise noted  Labs as ordered  Continue current meds as ordered  Advised healthy diet and exercise as tolerated;  BMI reviewed  Follow-up Disposition:  Return in about 4 months (around 8/24/2018) for sugar. An After Visit Summary was printed and given to the patient. This has been fully explained to the patient, who indicates understanding. OTC cerumen

## 2018-06-11 NOTE — TELEPHONE ENCOUNTER
Pt called in requesting refill of his   Requested Prescriptions     Pending Prescriptions Disp Refills    losartan-hydroCHLOROthiazide (HYZAAR) 100-25 mg per tablet 90 Tab 3     Sig: take 1 tablet by mouth daily    fenofibrate nanocrystallized (TRICOR) 145 mg tablet 90 Tab 3     Sig: take 1 tablet by mouth daily   .

## 2018-06-13 RX ORDER — FENOFIBRATE 145 MG/1
TABLET, COATED ORAL
Qty: 90 TAB | Refills: 3 | Status: SHIPPED | OUTPATIENT
Start: 2018-06-13 | End: 2018-08-23 | Stop reason: SDUPTHER

## 2018-06-13 RX ORDER — LOSARTAN POTASSIUM AND HYDROCHLOROTHIAZIDE 25; 100 MG/1; MG/1
TABLET ORAL
Qty: 90 TAB | Refills: 3 | Status: SHIPPED | OUTPATIENT
Start: 2018-06-13 | End: 2018-08-23 | Stop reason: SDUPTHER

## 2018-06-14 RX ORDER — FENOFIBRATE 145 MG/1
TABLET, COATED ORAL
Qty: 90 TAB | Refills: 3 | Status: SHIPPED | OUTPATIENT
Start: 2018-06-14 | End: 2019-06-06 | Stop reason: SDUPTHER

## 2018-06-14 RX ORDER — LOSARTAN POTASSIUM AND HYDROCHLOROTHIAZIDE 25; 100 MG/1; MG/1
TABLET ORAL
Qty: 90 TAB | Refills: 3 | Status: SHIPPED | OUTPATIENT
Start: 2018-06-14 | End: 2018-12-19 | Stop reason: SDUPTHER

## 2018-07-01 NOTE — PROGRESS NOTES
HISTORY OF PRESENT ILLNESS  Luz Sawyer is a 63 y.o. male. HPI   Patient is here today for evaluation and treatment of; Cholesterol. Cholesterol: has a h/o hypertriglyceridemia: on tricor; tolertaes med well and is compliant ; He is nonfasting today. Pt is on hyzaar for HTN    Has aches and pains; he has a physical job. Current Outpatient Prescriptions:     losartan-hydroCHLOROthiazide (HYZAAR) 100-25 mg per tablet, take 1 tablet by mouth daily, Disp: 90 Tab, Rfl: 3    fenofibrate nanocrystallized (TRICOR) 145 mg tablet, take 1 tablet by mouth daily, Disp: 90 Tab, Rfl: 3    timolol (TIMOPTIC-XE) 0.5 % ophthalmic gel-forming, , Disp: , Rfl: 6    aspirin delayed-release 81 mg tablet, Take 81 mg by mouth daily. , Disp: , Rfl:     Review of Systems   Constitutional: Negative for chills and fever. Cardiovascular: Negative for chest pain, palpitations and leg swelling. Musculoskeletal: Positive for myalgias.         Aches and pains, neck and shoulder       Physical Exam   Visit Vitals    /84 (BP 1 Location: Left arm, BP Patient Position: Sitting)    Pulse 72    Temp 98.6 °F (37 °C) (Oral)    Resp 18    Ht 5' 7\" (1.702 m)    Wt 198 lb (89.8 kg)    SpO2 95%    BMI 31.01 kg/m2     General appearance: alert, cooperative, no distress, appears stated age  Neck: supple, symmetrical, trachea midline, no adenopathy, thyroid: not enlarged, symmetric, no tenderness/mass/nodules, no carotid bruit and no JVD  Lungs: clear to auscultation bilaterally  Heart: regular rate and rhythm, S1, S2 normal, no murmur, click, rub or gallop  Extremities: extremities normal, atraumatic, no cyanosis or edema  Lab Results   Component Value Date/Time    Cholesterol, total 173 05/31/2017 09:51 AM    HDL Cholesterol 38 05/31/2017 09:51 AM    LDL, calculated 68 05/31/2017 09:51 AM    VLDL, calculated 66 05/31/2017 09:51 AM    Triglyceride 331 05/31/2017 09:51 AM    CHOL/HDL Ratio 4.1 08/04/2010 04:45 PM     Lab Results   Component Value Date/Time    Sodium 140 05/31/2017 09:51 AM    Potassium 5.5 05/31/2017 09:51 AM    Chloride 95 05/31/2017 09:51 AM    CO2 26 05/31/2017 09:51 AM    Anion gap 19.0 05/31/2017 09:51 AM    Glucose 104 05/31/2017 09:51 AM    BUN 19 05/31/2017 09:51 AM    Creatinine 1.2 05/31/2017 09:51 AM    BUN/Creatinine ratio 15 10/26/2011 10:28 AM    GFR est AA 76 10/26/2011 10:28 AM    GFR est non-AA 66 10/26/2011 10:28 AM    Calcium 10.2 05/31/2017 09:51 AM         ASSESSMENT and PLAN    ICD-10-CM ICD-9-CM    1. Essential hypertension S25 203.6 METABOLIC PANEL, BASIC   2. Hypertriglyceridemia E78.1 272.1 LIPID PANEL      AST      ALT   3. Encounter for immunization Z23 V03.89 INFLUENZA VIRUS VAC QUAD,SPLIT,PRESV FREE SYRINGE IM       As above,   above all stable unless otherwise noted  Labs as ordered  Continue current meds as ordered  Follow-up Disposition:  Return in about 6 months (around 4/24/2018) for physical.  An After Visit Summary was printed and given to the patient. This has been fully explained to the patient, who indicates understanding. Prudent use of NSAIDs; tylenol preferred for aches and pains. (2) potential problem

## 2018-08-23 ENCOUNTER — OFFICE VISIT (OUTPATIENT)
Dept: FAMILY MEDICINE CLINIC | Age: 63
End: 2018-08-23

## 2018-08-23 VITALS
OXYGEN SATURATION: 98 % | RESPIRATION RATE: 16 BRPM | WEIGHT: 200 LBS | TEMPERATURE: 98 F | BODY MASS INDEX: 31.39 KG/M2 | DIASTOLIC BLOOD PRESSURE: 80 MMHG | SYSTOLIC BLOOD PRESSURE: 130 MMHG | HEART RATE: 70 BPM | HEIGHT: 67 IN

## 2018-08-23 DIAGNOSIS — G44.52 NEW DAILY PERSISTENT HEADACHE: ICD-10-CM

## 2018-08-23 DIAGNOSIS — R73.9 ELEVATED BLOOD SUGAR: Primary | ICD-10-CM

## 2018-08-23 DIAGNOSIS — E78.1 HYPERTRIGLYCERIDEMIA: ICD-10-CM

## 2018-08-23 LAB
AVG GLU, 10930: 108 MG/DL (ref 91–123)
HBA1C MFR BLD HPLC: 5.4 % (ref 4.8–5.9)

## 2018-08-23 NOTE — PROGRESS NOTES
HISTORY OF PRESENT ILLNESS  George Bhakta is a 66 y.o. male. HPI  Patient is here today for evaluational and treatment of: Blood Sugar Problem    Blood Sugar:  Last blood sugar was 110; he is active on his job. He does not exercise otherwise. Last A1c is as below. Pt is fasting;      Lab Results   Component Value Date/Time    Hemoglobin A1c 5.5 03/02/2015 08:58 AM     BP is initially elevated; better at second check. He is still having a pain in left top of head ; present daily; pain is intermittent and worse in am;  Takes an ibuprofen and this helps. Takes ibuprofen 2-3 times a week. He had a CT on 2016 that was normal.       Current Outpatient Prescriptions:     losartan-hydroCHLOROthiazide (HYZAAR) 100-25 mg per tablet, take 1 tablet by mouth once daily, Disp: 90 Tab, Rfl: 3    fenofibrate nanocrystallized (TRICOR) 145 mg tablet, take 1 tablet by mouth once daily, Disp: 90 Tab, Rfl: 3    timolol (TIMOPTIC-XE) 0.5 % ophthalmic gel-forming, , Disp: , Rfl: 6    aspirin delayed-release 81 mg tablet, Take 81 mg by mouth daily. , Disp: , Rfl:       PMH,  Meds, Allergies, Family History, Social history reviewed    Review of Systems   Constitutional: Negative for chills and fever. Respiratory: Negative for shortness of breath. Cardiovascular: Negative for chest pain and palpitations. Physical Exam   Constitutional: He is oriented to person, place, and time. He appears well-developed and well-nourished. No distress. Cardiovascular: Normal rate and regular rhythm. Exam reveals no gallop and no friction rub. No murmur heard. Pulmonary/Chest: Breath sounds normal. No respiratory distress. He has no wheezes. He has no rales. Musculoskeletal: He exhibits no edema. Neurological: He is alert and oriented to person, place, and time. No cranial nerve deficit. Nursing note and vitals reviewed.     Visit Vitals    /80    Pulse 70    Temp 98 °F (36.7 °C) (Oral)    Resp 16    Ht 5' 7\" (1.702 m)    Wt 200 lb (90.7 kg)    SpO2 98%    BMI 31.32 kg/m2         ASSESSMENT and PLAN    ICD-10-CM ICD-9-CM    1. Elevated blood sugar-  P10.5 016.16 METABOLIC PANEL, BASIC      HEMOGLOBIN A1C WITH EAG   2. New daily persistent headache G44.52 339.42    3. Hypertriglyceridemia E78.1 272.1 LIPID PANEL      ALT      AST       As above, not controlled   treatment plan as listed below  Orders Placed This Encounter    METABOLIC PANEL, BASIC    HEMOGLOBIN A1C WITH EAG    LIPID PANEL    ALT    AST    REFERRAL TO NEUROLOGY     Follow-up Disposition:  Return in about 4 months (around 12/23/2018) for htn/triglyceridemia/sugar. An After Visit Summary was printed and given to the patient. This has been fully explained to the patient, who indicates understanding.

## 2018-08-23 NOTE — PATIENT INSTRUCTIONS

## 2018-08-23 NOTE — MR AVS SNAPSHOT
Yumi Smithford 
 
 
 [address] [address] [facility] [address] 
[phone] Patient: Mell Vela MRN:  [id number] Visit Information Date & Time Provider Department Dept. Phone Encounter #  
 8/23/2018  8:20 AM Asa Hu, [address] [place] [address] [phone] [id number] Follow-up Instructions Return in about 4 months (around 12/23/2018) for htn/triglyceridemia/sugar. Upcoming Health Maintenance Date Due ZOSTER VACCINE AGE 60> 11/13/2016 Influenza Age 5 to Adult 8/1/2018 DTaP/Tdap/Td series (2 - Td) 7/5/2026 Allergies as of 8/23/2018  Review Complete On: 8/23/2018 By: Asa Hu MD  
 No Known Allergies Current Immunizations  Never Reviewed Name Date Influenza Vaccine (Quad) PF 10/24/2017, 12/8/2016, 9/2/2015, 10/1/2014 Influenza Vaccine PF 9/30/2013 Influenza Vaccine Split 10/26/2011 Tdap 7/5/2016 Not reviewed this visit You Were Diagnosed With   
  
 Codes Comments Elevated blood sugar    -  Primary ICD-10-CM: R73.9 ICD-9-CM: 790.29 New daily persistent headache     ICD-10-CM: G44.52 
ICD-9-CM: 339.42 Hypertriglyceridemia     ICD-10-CM: E78.1 ICD-9-CM: 272.1 Vitals BP Pulse Temp Resp Height(growth percentile) Weight(growth percentile) 130/80 70 98 °F (36.7 °C) (Oral) 16 5' 7\" (1.702 m) 200 lb (90.7 kg) SpO2 BMI Smoking Status 98% 31.32 kg/m2 Never Smoker Vitals History BMI and BSA Data Body Mass Index Body Surface Area  
 31.32 kg/m 2 2.07 m 2 Preferred Pharmacy Pharmacy Name Phone [id number] [address] [facility], [address] [place] [address] Ave [phone] Your Updated Medication List  
  
   
This list is accurate as of 8/23/18  8:59 AM.  Always use your most recent med list.  
  
  
  
  
 aspirin delayed-release 81 mg tablet Take 81 mg by mouth daily. fenofibrate nanocrystallized 145 mg tablet Commonly known as:  TRICOR  
take 1 tablet by mouth once daily  
  
 losartan-hydroCHLOROthiazide 100-25 mg per tablet Commonly known as:  HYZAAR  
take 1 tablet by mouth once daily  
  
 timolol 0.5 % ophthalmic gel-forming Commonly known as:  TIMOPTIC-XE We Performed the Following REFERRAL TO NEUROLOGY [SAC73 Custom] Comments:  
 Please evaluate for headache Follow-up Instructions Return in about 4 months (around 12/23/2018) for htn/triglyceridemia/sugar. To-Do List   
 08/23/2018 Lab:  ALT   
  
 08/23/2018 Lab:  AST   
  
 08/23/2018 Lab:  HEMOGLOBIN A1C WITH EAG   
  
 08/23/2018 Lab:  LIPID PANEL   
  
 08/23/2018 Lab:  METABOLIC PANEL, BASIC Referral Information Referral ID Referred By Referred To  
  
 [id number] Tran Espinosa MD   
   [address] [place] [address] Domingo Jones 9 Phone: [phone] Fax: [phone] Visits Status Start Date End Date 1 New Request 8/23/18 8/23/19 If your referral has a status of pending review or denied, additional information will be sent to support the outcome of this decision. Patient Instructions Headache: Care Instructions Your Care Instructions Headaches have many possible causes. Most headaches aren't a sign of a more serious problem, and they will get better on their own. Home treatment may help you feel better faster. The doctor has checked you carefully, but problems can develop later. If you notice any problems or new symptoms, get medical treatment right away. Follow-up care is a key part of your treatment and safety. Be sure to make and go to all appointments, and call your doctor if you are having problems. It's also a good idea to know your test results and keep a list of the medicines you take. How can you care for yourself at home? · Do not drive if you have taken a prescription pain medicine. · Rest in a quiet, dark room until your headache is gone. Close your eyes and try to relax or go to sleep. Don't watch TV or read. · Put a cold, moist cloth or cold pack on the painful area for 10 to 20 minutes at a time. Put a thin cloth between the cold pack and your skin. · Use a warm, moist towel or a heating pad set on low to relax tight shoulder and neck muscles. · Have someone gently massage your neck and shoulders. · Take pain medicines exactly as directed. ¨ If the doctor gave you a prescription medicine for pain, take it as prescribed. ¨ If you are not taking a prescription pain medicine, ask your doctor if you can take an over-the-counter medicine. · Be careful not to take pain medicine more often than the instructions allow, because you may get worse or more frequent headaches when the medicine wears off. · Do not ignore new symptoms that occur with a headache, such as a fever, weakness or numbness, vision changes, or confusion. These may be signs of a more serious problem. To prevent headaches · Keep a headache diary so you can figure out what triggers your headaches. Avoiding triggers may help you prevent headaches. Record when each headache began, how long it lasted, and what the pain was like (throbbing, aching, stabbing, or dull). Write down any other symptoms you had with the headache, such as nausea, flashing lights or dark spots, or sensitivity to bright light or loud noise. Note if the headache occurred near your period. List anything that might have triggered the headache, such as certain foods (chocolate, cheese, wine) or odors, smoke, bright light, stress, or lack of sleep. · Find healthy ways to deal with stress. Headaches are most common during or right after stressful times.  Take time to relax before and after you do something that has caused a headache in the past. 
 · Try to keep your muscles relaxed by keeping good posture. Check your jaw, face, neck, and shoulder muscles for tension, and try relaxing them. When sitting at a desk, change positions often, and stretch for 30 seconds each hour. · Get plenty of sleep and exercise. · Eat regularly and well. Long periods without food can trigger a headache. · Treat yourself to a massage. Some people find that regular massages are very helpful in relieving tension. · Limit caffeine by not drinking too much coffee, tea, or soda. But don't quit caffeine suddenly, because that can also give you headaches. · Reduce eyestrain from computers by blinking frequently and looking away from the computer screen every so often. Make sure you have proper eyewear and that your monitor is set up properly, about an arm's length away. · Seek help if you have depression or anxiety. Your headaches may be linked to these conditions. Treatment can both prevent headaches and help with symptoms of anxiety or depression. When should you call for help? Call 911 anytime you think you may need emergency care. For example, call if: 
  · You have signs of a stroke. These may include: 
¨ Sudden numbness, paralysis, or weakness in your face, arm, or leg, especially on only one side of your body. ¨ Sudden vision changes. ¨ Sudden trouble speaking. ¨ Sudden confusion or trouble understanding simple statements. ¨ Sudden problems with walking or balance. ¨ A sudden, severe headache that is different from past headaches.  
 Call your doctor now or seek immediate medical care if: 
  · You have a new or worse headache.  
  · Your headache gets much worse. Where can you learn more? Go to http://marsha-porfirio.info/. Enter M271 in the search box to learn more about \"Headache: Care Instructions. \" Current as of: October 9, 2017 Content Version: 11.7 © 6905-9843 [employer].  Care instructions adapted under license by [address] (which disclaims liability or warranty for this information). If you have questions about a medical condition or this instruction, always ask your healthcare professional. [address] any warranty or liability for your use of this information. Introducing [facility]! Dear Joe Barrientos: Thank you for requesting a 9You account. Our records indicate that you already have an active 9You account. You can access your account anytime at [email or web address] Did you know that you can access your hospital and ER discharge instructions at any time in 9You? You can also review all of your test results from your hospital stay or ER visit. Additional Information If you have questions, please visit the Frequently Asked Questions section of the 9You website at [email or web address] Remember, 9You is NOT to be used for urgent needs. For medical emergencies, dial 911. Now available from your iPhone and Android! Please provide this summary of care documentation to your next provider. Your primary care clinician is listed as [address]. If you have any questions after today's visit, please call [phone].

## 2018-08-23 NOTE — PROGRESS NOTES
1. Have you been to the ER, urgent care clinic since your last visit? Hospitalized since your last visit? No    2. Have you seen or consulted any other health care providers outside of the [address] [place] [address] since your last visit? Include any pap smears or colon screening.  No

## 2018-08-24 LAB
ALT SERPL-CCNC: 12 U/L (ref 5–40)
ANION GAP SERPL CALC-SCNC: 17 MMOL/L
AST SERPL W P-5'-P-CCNC: 20 U/L (ref 10–37)
BUN SERPL-MCNC: 25 MG/DL (ref 6–22)
CALCIUM SERPL-MCNC: 9.7 MG/DL (ref 8.4–10.4)
CHLORIDE SERPL-SCNC: 94 MMOL/L (ref 98–110)
CHOLEST SERPL-MCNC: 190 MG/DL (ref 110–200)
CO2 SERPL-SCNC: 27 MMOL/L (ref 20–32)
CREAT SERPL-MCNC: 1.2 MG/DL (ref 0.8–1.6)
GFRAA, 66117: >60
GFRNA, 66118: 59.3
GLUCOSE SERPL-MCNC: 103 MG/DL (ref 70–99)
HDLC SERPL-MCNC: 4.5 MG/DL (ref 0–5)
HDLC SERPL-MCNC: 42 MG/DL (ref 40–59)
LDLC SERPL CALC-MCNC: 94 MG/DL (ref 50–99)
POTASSIUM SERPL-SCNC: 4.6 MMOL/L (ref 3.5–5.5)
SODIUM SERPL-SCNC: 138 MMOL/L (ref 133–145)
TRIGL SERPL-MCNC: 269 MG/DL (ref 40–149)
VLDLC SERPL CALC-MCNC: 54 MG/DL (ref 8–30)

## 2018-10-19 ENCOUNTER — OFFICE VISIT (OUTPATIENT)
Dept: NEUROLOGY | Age: 63
End: 2018-10-19

## 2018-10-19 VITALS
SYSTOLIC BLOOD PRESSURE: 140 MMHG | OXYGEN SATURATION: 98 % | HEART RATE: 82 BPM | BODY MASS INDEX: 31.39 KG/M2 | RESPIRATION RATE: 16 BRPM | DIASTOLIC BLOOD PRESSURE: 78 MMHG | HEIGHT: 67 IN | TEMPERATURE: 98.1 F | WEIGHT: 200 LBS

## 2018-10-19 DIAGNOSIS — R51.9 CHRONIC LEFT-SIDED HEADACHES: Primary | ICD-10-CM

## 2018-10-19 DIAGNOSIS — M54.2 CERVICAL SPINE PAIN: ICD-10-CM

## 2018-10-19 DIAGNOSIS — G89.29 CHRONIC LEFT-SIDED HEADACHES: Primary | ICD-10-CM

## 2018-10-19 NOTE — LETTER
10/19/2018 9:59 AM 
 
Patient:  Madhav Najera YOB: 1955 Date of Visit: 10/19/2018 Dear Zeke Hernandez MD 
[address] [place] [address] [address] [place] [id number] VIA In Basket 
 : Thank you for referring Mr. Kajal Woods to me for evaluation/treatment. Below are the relevant portions of my assessment and plan of care. If you have questions, please do not hesitate to call me. I look forward to following Mr. Caba along with you.  
 
 
 
Sincerely, 
 
 
Zakiya Benoit MD

## 2018-10-19 NOTE — PROGRESS NOTES
Robert Gee is a 66 y.o., right handed male, with an established history of hypertension and glaucoma, who comes in complaining of headaches. He has pain that starts on the left side of the neck and radiates to the vertex. The pain fells like a sharp pain or a throbbing felt in the left parietal vertex region. There's no nausea or vomiting with the pain. There's no real warning, it just seems to start. He feels like the pain starts in the morning when he wakes up and gets better as the day progresses. He'll have 3 days per week lasting several hours and then seem to resolve. He takes ibuprofen for theses and gets relief within an hour. There's also significant pain in the both shoulders that's particularly bad in the morning. He wakes up with shoulder pain every day. He's been getting the headache about 2 years. There's no weakness, sensation lose, change in bowel, bladder or erectile function. There's no family history of headaches. No recent trauma. Social History; he's , lives with his wife. He doesn't smoke, use illicit drugs. He drinks about 5-6 beers daily. Works for [employer]. 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    Family History; mother  from cancer, had hypertension. Father with hypertension, diabetes. Siblings one  from cancer, had strokes, hypertension. Current Outpatient Medications   Medication Sig Dispense Refill    losartan-hydroCHLOROthiazide (HYZAAR) 100-25 mg per tablet take 1 tablet by mouth once daily 90 Tab 3    fenofibrate nanocrystallized (TRICOR) 145 mg tablet take 1 tablet by mouth once daily 90 Tab 3    timolol (TIMOPTIC-XE) 0.5 % ophthalmic gel-forming   6    aspirin delayed-release 81 mg tablet Take 81 mg by mouth daily.          Past Medical History:   Diagnosis Date    Allergic rhinitis     environmental    Allergic rhinitis 2010    Arthritis     neck, shoulder/ chiropractor    Essential hypertension, benign     Glaucoma 1/11/2010    Glaucoma 1/11/2010    HTN (hypertension), benign 1/11/2010    Hypertriglyceridemia        Past Surgical History:   Procedure Laterality Date    COLONOSCOPY  11/07    due Nov 2012    HX HERNIA REPAIR  1975    left groin       No Known Allergies    Patient Active Problem List   Diagnosis Code    HTN (hypertension), benign I10    Allergic rhinitis J30.9    Glaucoma H40.9    Hypertriglyceridemia E78.1    Unspecified hyperplasia of prostate with urinary obstruction and other lower urinary tract symptoms (LUTS) N40.1, N13.8         Review of Systems:   As above otherwise 11 point review of systems negative including;   Constitutional no fever or chills  Skin denies rash or itching  HENT  Denies tinnitus, hearing lose  Eyes denies diplopia vision lose  Respiratory denies shortness of breath  Cardiovascular denies chest pain, dyspnea on exertion  Gastrointestinal denies nausea, vomiting, diarrhea, constipation  Genitourinary denies incontinence  Musculoskeletal denies joint pain or swelling  Endocrine denies weight change  Hematology denies easy bruising or bleeding   Neurological as above in HPI      PHYSICAL EXAMINATION:      VITAL SIGNS:    Visit Vitals  /78 (BP 1 Location: Right arm, BP Patient Position: Sitting)   Pulse 82   Temp 98.1 °F (36.7 °C) (Oral)   Resp 16   Ht 5' 7\" (1.702 m)   Wt 90.7 kg (200 lb)   SpO2 98%   BMI 31.32 kg/m²       GENERAL: The patient is well developed, well nourished, and in no apparent distress. EXTREMITIES: No clubbing, cyanosis, or edema is identified. Pulses 2+ and symmetrical.  Muscle tone is normal.  HEAD:   Ear, nose, and throat appear to be without trauma. The patient is normocephalic. NEUROLOGIC EXAMINATION    MENTAL STATUS: The patient is awake, alert, and oriented x 4. Fund of knowledge is adequate. Speech is fluent and memory appears to be intact, both long and short term.     CRANIAL NERVES: II - Visual fields are full to confrontation. Funduscopic examination reveals flat disks bilaterally. Pupils are both 2 mm and briskly reactive to light and accommodation. III, IV, VI - Extraocular movements are intact and there is no nystagmus. V - Facial sensation is intact to pinprick and light touch. VII - Face is symmetrical.   VIII - Hearing is present. IX, X, XII- Palate rises symmetrically. Gag is present. Tongue is in the midline. XI - Shoulder shrugging and head turning intact  MOTOR:  The patient is 5/5 in all four limbs without any drift. Fine finger movements are symmetrical.  Isolated motor group testing reveals no focal abnormalities. Tone is normal.  Sensory examination is intact to pinprick, light touch and position sense testing. Reflexes are 2+ and symmetrical. Plantars are down going. Cerebellar examination reveals no gross ataxia or dysmetria. Gait is normal and the patient can tandem walk without any difficulty. 12/21/2016 10:44 AM    Thomas, Rad Results In         This is the newest version No older versions exist   Narrative     CT OF THE HEAD WITH AND WITHOUT CONTRAST. CLINICAL HISTORY: Headache to the top of the left side of head for 2 months. TECHNIQUE: 5 mm helical scan obtained of the head were obtained from the skull   vertex through the base of the skull prior to and following the administration   of 75 cc Isovue-300 intravenous contrast.  All CT scans are performed using dose   optimization techniques as appropriate to the performed exam including the   following: Automated exposure control, adjustment of mA and/or kV according to   patient size, and use of iterative reconstructive technique.        COMPARISON: None. FINDINGS:      The sulcal pattern and ventricular system are normal in size and configuration   for age. No intracranial hemorrhage. No mass effect. No enhancing mass lesions. The visualized paranasal sinuses are clear. The mastoid air cells are clear. The   visualized bony structures are unremarkable. Impression     IMPRESSION:      Unremarkable CT of the head with and without contrast.          I have reviewed the above imagines myself. CBC:   Lab Results   Component Value Date/Time    WBC 6.9 01/25/2011 08:25 AM    RBC 4.65 01/25/2011 08:25 AM    HGB 14.2 01/25/2011 08:25 AM    HCT 42.5 01/25/2011 08:25 AM    PLATELET 701 00/27/4203 08:25 AM     BMP:   Lab Results   Component Value Date/Time    Glucose 103 (H) 08/23/2018 09:06 AM    Sodium 138 08/23/2018 09:06 AM    Potassium 4.6 08/23/2018 09:06 AM    Chloride 94 (L) 08/23/2018 09:06 AM    CO2 27 08/23/2018 09:06 AM    BUN 25 (H) 08/23/2018 09:06 AM    Creatinine 1.2 08/23/2018 09:06 AM    Calcium 9.7 08/23/2018 09:06 AM     CMP:   Lab Results   Component Value Date/Time    Glucose 103 (H) 08/23/2018 09:06 AM    Sodium 138 08/23/2018 09:06 AM    Potassium 4.6 08/23/2018 09:06 AM    Chloride 94 (L) 08/23/2018 09:06 AM    CO2 27 08/23/2018 09:06 AM    BUN 25 (H) 08/23/2018 09:06 AM    Creatinine 1.2 08/23/2018 09:06 AM    Calcium 9.7 08/23/2018 09:06 AM    Anion gap 17.0 08/23/2018 09:06 AM    BUN/Creatinine ratio 15 10/26/2011 10:28 AM    Alk. phosphatase 46 01/25/2011 08:25 AM    Protein, total 6.9 01/25/2011 08:25 AM    Albumin 4.4 01/25/2011 08:25 AM    Globulin 3.3 08/04/2010 04:45 PM    A-G Ratio 1.8 01/25/2011 08:25 AM     Coagulation: No results found for: PTP, INR, APTT, PTTT  Cardiac markers: No results found for: CPK, CKND1, KAREN       Impression: New onset headaches for the last 2 years and this man who has risk factors including none. He has no family history and no pre-existing trauma. He does have significant neck and shoulder pain and the headaches seem to happen as he is waking up in the morning. I suspect the headaches are related to the neck pain that seems to be the primary cause of his problem.     Plan: Is already had a CT of the head ruling out any large intracranial.  Going to get a cervical spine MRI scan to make sure he does not have any significant cervical spine disease. I advised him that the ibuprofen that he is using, 2 tablets time, seems to be relieving his pain within an hour and I do not think better with prescription medications. Advised him that he can continue using that for the time being. I will see him back in about 4 weeks. PLEASE NOTE:   This document has been produced using voice recognition software. Unrecognized errors in transcription may be present.

## 2018-10-29 ENCOUNTER — TELEPHONE (OUTPATIENT)
Dept: NEUROLOGY | Age: 63
End: 2018-10-29

## 2018-10-29 NOTE — TELEPHONE ENCOUNTER
Adriana Shetty, a nurse  at [facility], called in reference to pt [facility]. Pt's surgical spine MRI has been denied per Dr. Soumya Garcia. Lelo Perrin states there is no documentation for any interference with pt's important daily functions and no documentation of treatments or injections. To appeal the decision, call [phone].

## 2018-11-27 ENCOUNTER — DOCUMENTATION ONLY (OUTPATIENT)
Dept: NEUROLOGY | Age: 63
End: 2018-11-27

## 2018-11-27 NOTE — PROGRESS NOTES
Chart review for scheduled visit reveals follow-up to discuss HA and MRI results; testing not completed at this time.

## 2018-12-19 ENCOUNTER — OFFICE VISIT (OUTPATIENT)
Dept: FAMILY MEDICINE CLINIC | Age: 63
End: 2018-12-19

## 2018-12-19 VITALS
RESPIRATION RATE: 16 BRPM | OXYGEN SATURATION: 98 % | HEART RATE: 83 BPM | SYSTOLIC BLOOD PRESSURE: 137 MMHG | TEMPERATURE: 98.2 F | HEIGHT: 67 IN | BODY MASS INDEX: 31.08 KG/M2 | WEIGHT: 198 LBS | DIASTOLIC BLOOD PRESSURE: 84 MMHG

## 2018-12-19 DIAGNOSIS — Z23 ENCOUNTER FOR IMMUNIZATION: ICD-10-CM

## 2018-12-19 DIAGNOSIS — R73.9 ELEVATED BLOOD SUGAR: ICD-10-CM

## 2018-12-19 DIAGNOSIS — E78.1 HYPERTRIGLYCERIDEMIA: ICD-10-CM

## 2018-12-19 DIAGNOSIS — I10 ESSENTIAL HYPERTENSION: Primary | ICD-10-CM

## 2018-12-19 RX ORDER — LOSARTAN POTASSIUM AND HYDROCHLOROTHIAZIDE 25; 100 MG/1; MG/1
TABLET ORAL
Qty: 90 TAB | Refills: 3 | Status: SHIPPED | OUTPATIENT
Start: 2018-12-19 | End: 2020-01-07 | Stop reason: SDUPTHER

## 2018-12-19 NOTE — PROGRESS NOTES
HISTORY OF PRESENT ILLNESS  Sukumar Curry is a 66 y.o. male. HPI  Patient is here today for evaluation and treatment of: Hypertension/Cholesterol/Elevated Blood Sugar    Hypertension: BP is very stable. Pt is on Hyzaar ;  Needs refill. Cholesterol:continues on tricor ; attempts a lower cholesterol diet. Does not have an exercise plan;  Stays active on his job    Elevated Blood Sugar: still manages this with diet. Eats less bread. Lab Results   Component Value Date/Time    Hemoglobin A1c 5.4 08/23/2018 09:06 AM           Current Outpatient Medications:     losartan-hydroCHLOROthiazide (HYZAAR) 100-25 mg per tablet, take 1 tablet by mouth once daily, Disp: 90 Tab, Rfl: 3    fenofibrate nanocrystallized (TRICOR) 145 mg tablet, take 1 tablet by mouth once daily, Disp: 90 Tab, Rfl: 3    timolol (TIMOPTIC-XE) 0.5 % ophthalmic gel-forming, , Disp: , Rfl: 6    aspirin delayed-release 81 mg tablet, Take 81 mg by mouth daily. , Disp: , Rfl:        PMH,  Meds, Allergies, Family History, Social history reviewed      Review of Systems   Constitutional: Negative for chills and fever. Respiratory: Negative for shortness of breath. Cardiovascular: Negative for chest pain and palpitations. Musculoskeletal: Positive for joint pain (chronic).        Physical Exam   Visit Vitals  /84 (BP 1 Location: Left arm, BP Patient Position: Sitting)   Pulse 83   Temp 98.2 °F (36.8 °C) (Oral)   Resp 16   Ht 5' 7\" (1.702 m)   Wt 198 lb (89.8 kg)   SpO2 98%   BMI 31.01 kg/m²     General appearance: alert, cooperative, no distress, appears stated age  Neck: supple, symmetrical, trachea midline, no adenopathy, thyroid: not enlarged, symmetric, no tenderness/mass/nodules, no carotid bruit and no JVD  Lungs: clear to auscultation bilaterally  Heart: regular rate and rhythm, S1, S2 normal, no murmur, click, rub or gallop  Extremities: extremities normal, atraumatic, no cyanosis or edema    Lab Results   Component Value Date/Time    Cholesterol, total 190 08/23/2018 09:06 AM    HDL Cholesterol 42 08/23/2018 09:06 AM    LDL, calculated 94 08/23/2018 09:06 AM    VLDL, calculated 54 (H) 08/23/2018 09:06 AM    Triglyceride 269 (H) 08/23/2018 09:06 AM    CHOL/HDL Ratio 4.1 08/04/2010 04:45 PM     Lab Results   Component Value Date/Time    Sodium 138 08/23/2018 09:06 AM    Potassium 4.6 08/23/2018 09:06 AM    Chloride 94 (L) 08/23/2018 09:06 AM    CO2 27 08/23/2018 09:06 AM    Anion gap 17.0 08/23/2018 09:06 AM    Glucose 103 (H) 08/23/2018 09:06 AM    BUN 25 (H) 08/23/2018 09:06 AM    Creatinine 1.2 08/23/2018 09:06 AM    BUN/Creatinine ratio 15 10/26/2011 10:28 AM    GFR est AA 76 10/26/2011 10:28 AM    GFR est non-AA 66 10/26/2011 10:28 AM    Calcium 9.7 08/23/2018 09:06 AM       ASSESSMENT and PLAN    ICD-10-CM ICD-9-CM    1. Essential hypertension Z77 327.4 METABOLIC PANEL, BASIC      METABOLIC PANEL, BASIC   2. Elevated blood sugar R73.9 790.29 HEMOGLOBIN A1C WITH EAG      HEMOGLOBIN A1C WITH EAG   3. Hypertriglyceridemia E78.1 272.1 LIPID PANEL      AST      ALT      ALT      AST      LIPID PANEL   4. Encounter for immunization Z23 V03.89 INFLUENZA VIRUS VAC QUAD,SPLIT,PRESV FREE SYRINGE IM       As above,   above all stable unless otherwise noted   treatment plan as listed below  Orders Placed This Encounter    Influenza virus vaccine (QUADRIVALENT PRES FREE SYRINGE) IM ([id number])    HEMOGLOBIN A1C WITH EAG    LIPID PANEL    AST    ALT    METABOLIC PANEL, BASIC    losartan-hydroCHLOROthiazide (HYZAAR) 100-25 mg per tablet     Refilled med needed  Follow-up Disposition:  Return in about 6 months (around 6/19/2019) for well exam.  An After Visit Summary was printed and given to the patient. This has been fully explained to the patient, who indicates understanding.

## 2018-12-19 NOTE — PATIENT INSTRUCTIONS
Learning About Diuretics for High Blood Pressure  Introduction  Diuretics help to lower blood pressure. This reduces your risk of a heart attack and stroke. It also reduces your risk of kidney disease. Diuretics cause your kidneys to remove sodium and water. They also relax the blood vessel walls. These help lower your blood pressure. Examples  · Chlorthalidone  · Hydrochlorothiazide  Possible side effects  There are some common side effects. They are:  · Too little potassium. · Feeling dizzy. · Rash. · Urinating a lot. · High blood sugar. (But this is not common.)  You may have other side effects. Check the information that comes with your medicine. What to know about taking this medicine  · You may take other medicines for blood pressure. Diuretics can help those work better. They can also prevent extra fluid in your body. · You may need to take potassium pills. Or you may have to watch how much potassium is in your food. Ask your doctor about this. · You may need blood tests to check your kidneys and your potassium level. · Take your medicines exactly as prescribed. Call your doctor if you think you are having a problem with your medicine. · Check with your doctor or pharmacist before you use any other medicines. This includes over-the-counter medicines. Make sure your doctor knows all of the medicines, vitamins, herbal products, and supplements you take. Taking some medicines together can cause problems. Where can you learn more? Go to http://marsha-porfirio.info/. Enter T005 in the search box to learn more about \"Learning About Diuretics for High Blood Pressure. \"  Current as of: December 6, 2017  Content Version: 11.8  © 4665-2260 [employer]. Care instructions adapted under license by [employer] (which disclaims liability or warranty for this information).  If you have questions about a medical condition or this instruction, always ask your healthcare professional. [address] any warranty or liability for your use of this information.

## 2018-12-19 NOTE — PROGRESS NOTES
1. Have you been to the ER, urgent care clinic since your last visit? Hospitalized since your last visit? No    2. Have you seen or consulted any other health care providers outside of the [address] [place] [address] since your last visit? Include any pap smears or colon screening.  No

## 2018-12-20 ENCOUNTER — OFFICE VISIT (OUTPATIENT)
Dept: UROLOGY | Age: 63
End: 2018-12-20

## 2018-12-20 VITALS
HEIGHT: 67 IN | DIASTOLIC BLOOD PRESSURE: 87 MMHG | SYSTOLIC BLOOD PRESSURE: 151 MMHG | WEIGHT: 198 LBS | OXYGEN SATURATION: 98 % | BODY MASS INDEX: 31.08 KG/M2 | HEART RATE: 88 BPM

## 2018-12-20 DIAGNOSIS — N40.1 BENIGN PROSTATIC HYPERPLASIA WITH LOWER URINARY TRACT SYMPTOMS, SYMPTOM DETAILS UNSPECIFIED: Primary | ICD-10-CM

## 2018-12-20 LAB
ALT SERPL-CCNC: 13 U/L (ref 5–40)
ANION GAP SERPL CALC-SCNC: 16 MMOL/L
AST SERPL W P-5'-P-CCNC: 19 U/L (ref 10–37)
AVG GLU, 10930: 109 MG/DL (ref 91–123)
BILIRUB UR QL STRIP: NEGATIVE
BUN SERPL-MCNC: 22 MG/DL (ref 6–22)
CALCIUM SERPL-MCNC: 10.2 MG/DL (ref 8.4–10.4)
CHLORIDE SERPL-SCNC: 93 MMOL/L (ref 98–110)
CHOLEST SERPL-MCNC: 195 MG/DL (ref 110–200)
CO2 SERPL-SCNC: 27 MMOL/L (ref 20–32)
CREAT SERPL-MCNC: 1.1 MG/DL (ref 0.8–1.6)
GFRAA, 66117: >60
GFRNA, 66118: >60
GLUCOSE SERPL-MCNC: 108 MG/DL (ref 70–99)
GLUCOSE UR-MCNC: NEGATIVE MG/DL
HBA1C MFR BLD HPLC: 5.4 % (ref 4.8–5.9)
HDLC SERPL-MCNC: 4.6 MG/DL (ref 0–5)
HDLC SERPL-MCNC: 42 MG/DL (ref 40–59)
KETONES P FAST UR STRIP-MCNC: NEGATIVE MG/DL
LDLC SERPL CALC-MCNC: 102 MG/DL (ref 50–99)
PH UR STRIP: 7.5 [PH] (ref 4.6–8)
POTASSIUM SERPL-SCNC: 4.9 MMOL/L (ref 3.5–5.5)
PROT UR QL STRIP: NEGATIVE
SODIUM SERPL-SCNC: 136 MMOL/L (ref 133–145)
SP GR UR STRIP: 1.02 (ref 1–1.03)
TRIGL SERPL-MCNC: 258 MG/DL (ref 40–149)
UA UROBILINOGEN AMB POC: NORMAL (ref 0.2–1)
URINALYSIS CLARITY POC: CLEAR
URINALYSIS COLOR POC: YELLOW
URINE BLOOD POC: NEGATIVE
URINE LEUKOCYTES POC: NEGATIVE
URINE NITRITES POC: NEGATIVE
VLDLC SERPL CALC-MCNC: 52 MG/DL (ref 8–30)

## 2018-12-20 NOTE — PATIENT INSTRUCTIONS
Prostate Cancer Screening: Care Instructions  Your Care Instructions    The prostate gland is an organ found just below a man's bladder. It is the size and shape of a walnut. It surrounds the tube that carries urine from the bladder out of the body through the penis. This tube is called the urethra. Prostate cancer is the abnormal growth of cells in the prostate. It is the second most common type of cancer in men. (Skin cancer is the most common.)  Most cases of prostate cancer occur in men older than 72. The disease runs in families. And it's more common in -American men. When it's found and treated early, prostate cancer may be cured. But it is not always treated. This is because prostate cancer may not shorten your life, especially if you are older and the cancer is growing slowly. Follow-up care is a key part of your treatment and safety. Be sure to make and go to all appointments, and call your doctor if you are having problems. It's also a good idea to know your test results and keep a list of the medicines you take. What are the screening tests for prostate cancer? The main screening test for prostate cancer is the prostate-specific antigen (PSA) test. This is a blood test that measures how much PSA is in your blood. A high level may mean that you have an enlargement, an infection, or cancer. Along with the PSA test, you may have a digital rectal exam. The digital (finger) rectal exam checks for anything abnormal in your prostate. To do the exam, the doctor puts a lubricated, gloved finger into your rectum. If these tests suggest cancer, you may need a prostate biopsy. How is prostate cancer diagnosed? In a biopsy, the doctor takes small tissue samples from your prostate gland. Another doctor then looks at the tissue under a microscope to see if there are cancer cells, signs of infection, or other problems. The results help diagnose prostate cancer.   What are the pros and cons of screening? Neither a PSA test nor a digital rectal exam can tell you for sure that you do or do not have cancer. But they can help you decide if you need more tests, such as a prostate biopsy. Screening tests may be useful because most men with prostate cancer don't have symptoms. It can be hard to know if you have cancer until it is more advanced. And then it's harder to treat. But having a PSA test can also cause harm. The test may show high levels of PSA that aren't caused by cancer. So you could have a prostate biopsy you didn't need. Or the PSA test might be normal when there is cancer, so a cancer might not be found early. The test can also find cancers that would never have caused a problem during your lifetime. So you might have treatment that was not needed. Prostate cancer usually develops late in life and grows slowly. For many men, it does not shorten their lives. Some experts advise screening only for men who are at high risk. Talk with your doctor to see if screening is right for you. Where can you learn more? Go to http://marsha-porfirio.info/. Enter R550 in the search box to learn more about \"Prostate Cancer Screening: Care Instructions. \"  Current as of: March 28, 2018  Content Version: 11.8  © 0310-0330 [employer]. Care instructions adapted under license by [employer] (which disclaims liability or warranty for this information). If you have questions about a medical condition or this instruction, always ask your healthcare professional. [place] [address] any warranty or liability for your use of this information.

## 2018-12-20 NOTE — PROGRESS NOTES
Mr. Kaur Yousif has a reminder for a \"due or due soon\" health maintenance. I have asked that he contact his primary care provider for follow-up on this health maintenance. RBV Per Dr. Papo De La Rosa draw lab today for PSA for BPH.

## 2018-12-20 NOTE — PROGRESS NOTES
Brent Courser Peoples 66 y.o. male     Mr. James Jang seen today for routine prostate exam-patient is voiding well with a forceful stream good control no nocturia  No complaints regarding urination at this time  Voiding with a solid stream good control nocturia 2 episodes per night-not bothersome    PSA 0.6 in November 2013  PSA 0.4 in Nov 2014  PSA 0.7 in November 2015  PSA 0.5 in November 2016    PVR 13 cc in November 2017   cc in December 2018       Review of Systems:   CNS: No seizures syncope or headaches  Respiratory: Allergic rhinitis  Cardiovascular:Hypertension no palpitations or chest pain  Intestinal:No dyspepsia or change in bowel  Urinary: Mild BPH symptoms not  Skeletal: Large joint arthritis  Endocrine:No diabetes or thyroid disease  Other:     Allergies: No Known Allergies   Medications:    Current Outpatient Medications   Medication Sig Dispense Refill    losartan-hydroCHLOROthiazide (HYZAAR) 100-25 mg per tablet take 1 tablet by mouth once daily 90 Tab 3    fenofibrate nanocrystallized (TRICOR) 145 mg tablet take 1 tablet by mouth once daily 90 Tab 3    timolol (TIMOPTIC-XE) 0.5 % ophthalmic gel-forming   6    aspirin delayed-release 81 mg tablet Take 81 mg by mouth daily.          Past Medical History:   Diagnosis Date    Allergic rhinitis     environmental    Allergic rhinitis 1/11/2010    Arthritis     neck, shoulder/ chiropractor    Essential hypertension, benign     Glaucoma 1/11/2010    Glaucoma 1/11/2010    HTN (hypertension), benign 1/11/2010    Hypertriglyceridemia       Past Surgical History:   Procedure Laterality Date    COLONOSCOPY  11/07    due Nov 2012    HX HERNIA REPAIR  1975    left groin     Social History     Socioeconomic History    Marital status:      Spouse name: Not on file    Number of children: Not on file    Years of education: Not on file    Highest education level: Not on file   Social Needs    Financial resource strain: Not on file   [place] insecurity - worry: Not on file    Food insecurity - inability: Not on file    Transportation needs - medical: Not on file   [employer] needs - non-medical: Not on file   Occupational History    Occupation: mosquito control     Comment: [employer]   Tobacco Use    Smoking status: Never Smoker    Smokeless tobacco: Never Used   Substance and Sexual Activity    Alcohol use: Yes     Comment: occ beer    Drug use: No    Sexual activity: Not on file     Comment:    Other Topics Concern     Service Not Asked    Blood Transfusions Not Asked    Caffeine Concern Yes     Comment: 2 cups of coffee per day    Occupational Exposure Not Asked    Hobby Hazards Not Asked    Sleep Concern Not Asked    Stress Concern Not Asked    Weight Concern Not Asked    Special Diet Not Asked    Back Care Not Asked    Exercise Yes     Comment: walks 1 mile three times a week    Bike Helmet Not Asked    Seat Belt Yes    Self-Exams Not Asked   Social History Narrative    Not on file      Family History   Problem Relation Age of Onset    Diabetes Mother     Cancer Mother         bone ca    Diabetes Father         Physical Examination: Well-nourished trim and fit appearing adult male in no apparent distress    Prostate by FIDELIA is large rounded smooth benign consistency nontender-no induration no nodularity  No rectal masses induration or tenderness       urinalysis: Negative dipstick/nitrite negative/heme-negative    PVR today 145 cc      Impression: Asymptomatic BPH         Plan: PSA today      RTC 1 year PSA FIDELIA PVR      More than 1/2 of this  15 minute visit was spent in counselling and coordination of care, as described above. Melvin Andrade MD  -electronically signed-    PLEASE NOTE:  This document has been produced using voice recognition software. Unrecognized errors in transcription may be present.

## 2018-12-21 LAB — PSA SERPL-MCNC: 0.42 NG/ML

## 2019-06-06 RX ORDER — LOSARTAN POTASSIUM AND HYDROCHLOROTHIAZIDE 25; 100 MG/1; MG/1
TABLET ORAL
Qty: 90 TAB | Refills: 3 | OUTPATIENT
Start: 2019-06-06

## 2019-06-06 RX ORDER — FENOFIBRATE 145 MG/1
TABLET, COATED ORAL
Qty: 90 TAB | Refills: 0 | Status: SHIPPED | OUTPATIENT
Start: 2019-06-06 | End: 2019-09-04 | Stop reason: SDUPTHER

## 2019-06-06 NOTE — TELEPHONE ENCOUNTER
Spoke with Kenrick Erci at [facility] to inquire if refills are available for Hyzaar. Kenrick Eric verbalized understanding and stated refills are available.

## 2019-06-06 NOTE — TELEPHONE ENCOUNTER
Requested Prescriptions     Pending Prescriptions Disp Refills    fenofibrate nanocrystallized (TRICOR) 145 mg tablet [Pharmacy Med Name: FENOFIBRATE 145 MG TABLET] 90 Tab 3     Sig: take 1 tablet by mouth daily    losartan-hydroCHLOROthiazide (HYZAAR) 100-25 mg per tablet 90 Tab 3     Sig: take 1 tablet by mouth once daily

## 2019-07-03 ENCOUNTER — OFFICE VISIT (OUTPATIENT)
Dept: FAMILY MEDICINE CLINIC | Age: 64
End: 2019-07-03

## 2019-07-03 VITALS
OXYGEN SATURATION: 100 % | TEMPERATURE: 98.4 F | HEIGHT: 67 IN | WEIGHT: 197.2 LBS | RESPIRATION RATE: 18 BRPM | HEART RATE: 73 BPM | BODY MASS INDEX: 30.95 KG/M2 | SYSTOLIC BLOOD PRESSURE: 140 MMHG | DIASTOLIC BLOOD PRESSURE: 80 MMHG

## 2019-07-03 DIAGNOSIS — Z00.00 WELL ADULT EXAM: Primary | ICD-10-CM

## 2019-07-03 DIAGNOSIS — I10 ESSENTIAL HYPERTENSION: ICD-10-CM

## 2019-07-03 DIAGNOSIS — R73.9 ELEVATED BLOOD SUGAR: ICD-10-CM

## 2019-07-03 DIAGNOSIS — E78.1 HYPERTRIGLYCERIDEMIA: ICD-10-CM

## 2019-07-03 NOTE — PROGRESS NOTES
Patient is here for well male check        1. Have you been to the ER, urgent care clinic since your last visit? Hospitalized since your last visit? No    2. Have you seen or consulted any other health care providers outside of the [address] [place] [address] since your last visit? Include any pap smears or colon screening.  No

## 2019-07-03 NOTE — PROGRESS NOTES
Subjective:     Angelica Doan is a 60 y.o. male presenting for annual exam and complete physical.    Initial BP is up;  Better at second check. Pt is fasting. Has had some chronic neck pain; MRI denied by insurance as ordered by neuro  In October. Wt Readings from Last 3 Encounters:   12/20/18 198 lb (89.8 kg)   12/19/18 198 lb (89.8 kg)   10/19/18 200 lb (90.7 kg)         Patient Active Problem List    Diagnosis Date Noted    Unspecified hyperplasia of prostate with urinary obstruction and other lower urinary tract symptoms (LUTS)     HTN (hypertension), benign 01/11/2010    Allergic rhinitis 01/11/2010    Glaucoma 01/11/2010    Hypertriglyceridemia 01/11/2010     Current Outpatient Medications   Medication Sig Dispense Refill    fenofibrate nanocrystallized (TRICOR) 145 mg tablet Take 1 tablet by mouth daily. APPOINTMENT REQUIRED BEFORE NEXT REFILL. 90 Tab 0    losartan-hydroCHLOROthiazide (HYZAAR) 100-25 mg per tablet take 1 tablet by mouth once daily 90 Tab 3    timolol (TIMOPTIC-XE) 0.5 % ophthalmic gel-forming   6    aspirin delayed-release 81 mg tablet Take 81 mg by mouth daily.        No Known Allergies  Past Medical History:   Diagnosis Date    Allergic rhinitis     environmental    Allergic rhinitis 1/11/2010    Arthritis     neck, shoulder/ chiropractor    Essential hypertension, benign     Glaucoma 1/11/2010    Glaucoma 1/11/2010    HTN (hypertension), benign 1/11/2010    Hypertriglyceridemia      Past Surgical History:   Procedure Laterality Date    COLONOSCOPY  11/07    due Nov 2012    HX HERNIA REPAIR  1975    left groin     Family History   Problem Relation Age of Onset    Diabetes Mother     Cancer Mother         bone ca    Diabetes Father      Social History     Tobacco Use    Smoking status: Never Smoker    Smokeless tobacco: Never Used   Substance Use Topics    Alcohol use: Yes     Comment: occ beer        Lab Results   Component Value Date/Time Hemoglobin A1c 5.4 12/19/2018 09:37 AM    Hemoglobin A1c 5.4 08/23/2018 09:06 AM    Hemoglobin A1c 5.5 03/02/2015 08:58 AM    Glucose 108 (H) 12/19/2018 09:37 AM    LDL, calculated 102 (H) 12/19/2018 09:37 AM    Creatinine 1.1 12/19/2018 09:37 AM      Lab Results   Component Value Date/Time    Cholesterol, total 195 12/19/2018 09:37 AM    HDL Cholesterol 42 12/19/2018 09:37 AM    LDL, calculated 102 (H) 12/19/2018 09:37 AM    Triglyceride 258 (H) 12/19/2018 09:37 AM    CHOL/HDL Ratio 4.1 08/04/2010 04:45 PM     Lab Results   Component Value Date/Time    ALT (SGPT) 13 12/19/2018 09:37 AM    AST (SGOT) 19 12/19/2018 09:37 AM    Alk. phosphatase 46 01/25/2011 08:25 AM    Bilirubin, total 0.3 01/25/2011 08:25 AM    Albumin 4.4 01/25/2011 08:25 AM    Protein, total 6.9 01/25/2011 08:25 AM    PLATELET 468 27/03/9138 08:25 AM     Lab Results   Component Value Date/Time    GFR est non-AA 66 10/26/2011 10:28 AM    GFR est AA 76 10/26/2011 10:28 AM    Creatinine 1.1 12/19/2018 09:37 AM    BUN 22 12/19/2018 09:37 AM    Sodium 136 12/19/2018 09:37 AM    Potassium 4.9 12/19/2018 09:37 AM    Chloride 93 (L) 12/19/2018 09:37 AM    CO2 27 12/19/2018 09:37 AM        Review of Systems  A comprehensive review of systems was negative except for that written in the HPI. Objective:     Visit Vitals  /80     Physical exam:   General appearance - alert, well appearing, and in no distress  Ears - bilateral TM's and external ear canals normal, ear wax in right ext canal;  curretted a moderate amount form ear canal;  Pt advised to schedule subsequent appt. For cerumen impaction removal if so desires.    Nose - normal and patent, no erythema, discharge or polyps  Mouth - mucous membranes moist, pharynx normal without lesions  Neck - supple, no significant adenopathy  Lymphatics - no palpable lymphadenopathy, no hepatosplenomegaly  Chest - clear to auscultation, no wheezes, rales or rhonchi, symmetric air entry  Heart - normal rate, regular rhythm, normal S1, S2, no murmurs, rubs, clicks or gallops  Abdomen - soft, nontender, nondistended, no masses or organomegaly  Neurological - alert, oriented, normal speech, no focal findings or movement disorder noted  Musculoskeletal - no joint tenderness, deformity or swelling  Extremities - no pedal edema noted  Skin - normal coloration and turgor, no rashes, no suspicious skin lesions noted     Assessment/Plan:       ICD-10-CM ICD-9-CM    1. Well adult exam Z00.00 V70.0    2. Essential hypertension- for lab only B63 721.9 METABOLIC PANEL, BASIC   3. Elevated blood sugar- for lab only R73.9 790.29 HEMOGLOBIN A1C WITH EAG   4. Hypertriglyceridemia- for lab only E78.1 272.1 ALT      AST      LIPID PANEL   . As above,   above all stable unless otherwise noted   treatment plan as listed below  Orders Placed This Encounter    HEMOGLOBIN A1C WITH EAG    ALT    AST    METABOLIC PANEL, BASIC    LIPID PANEL     Follow-up and Dispositions    · Return in about 6 months (around 1/3/2020) for triglyceride /HTN. An After Visit Summary was printed and given to the patient. This has been fully explained to the patient, who indicates understanding.

## 2019-07-03 NOTE — PATIENT INSTRUCTIONS

## 2019-07-04 LAB
ALT SERPL-CCNC: 22 U/L (ref 5–40)
ANION GAP SERPL CALC-SCNC: 15 MMOL/L
AST SERPL W P-5'-P-CCNC: 36 U/L (ref 10–37)
AVG GLU, 10930: 107 MG/DL (ref 91–123)
BUN SERPL-MCNC: 25 MG/DL (ref 6–22)
CALCIUM SERPL-MCNC: 10.1 MG/DL (ref 8.4–10.4)
CHLORIDE SERPL-SCNC: 97 MMOL/L (ref 98–110)
CHOLEST SERPL-MCNC: 186 MG/DL (ref 110–200)
CO2 SERPL-SCNC: 27 MMOL/L (ref 20–32)
CREAT SERPL-MCNC: 1.3 MG/DL (ref 0.8–1.6)
GFRAA, 66117: >60
GFRNA, 66118: 54
GLUCOSE SERPL-MCNC: 111 MG/DL (ref 70–99)
HBA1C MFR BLD HPLC: 5.3 % (ref 4.8–5.9)
HDLC SERPL-MCNC: 3.6 MG/DL (ref 0–5)
HDLC SERPL-MCNC: 51 MG/DL (ref 40–59)
LDLC SERPL CALC-MCNC: 94 MG/DL (ref 50–99)
POTASSIUM SERPL-SCNC: 5.5 MMOL/L (ref 3.5–5.5)
SODIUM SERPL-SCNC: 139 MMOL/L (ref 133–145)
TRIGL SERPL-MCNC: 205 MG/DL (ref 40–149)
VLDLC SERPL CALC-MCNC: 41 MG/DL (ref 8–30)

## 2020-01-07 ENCOUNTER — OFFICE VISIT (OUTPATIENT)
Dept: FAMILY MEDICINE CLINIC | Age: 65
End: 2020-01-07

## 2020-01-07 VITALS
DIASTOLIC BLOOD PRESSURE: 68 MMHG | HEART RATE: 79 BPM | WEIGHT: 202 LBS | RESPIRATION RATE: 16 BRPM | TEMPERATURE: 98.6 F | OXYGEN SATURATION: 99 % | SYSTOLIC BLOOD PRESSURE: 130 MMHG | HEIGHT: 67 IN | BODY MASS INDEX: 31.71 KG/M2

## 2020-01-07 DIAGNOSIS — E78.1 HYPERTRIGLYCERIDEMIA: ICD-10-CM

## 2020-01-07 DIAGNOSIS — R73.9 ELEVATED BLOOD SUGAR: ICD-10-CM

## 2020-01-07 DIAGNOSIS — Z23 ENCOUNTER FOR IMMUNIZATION: ICD-10-CM

## 2020-01-07 DIAGNOSIS — I10 ESSENTIAL HYPERTENSION: Primary | ICD-10-CM

## 2020-01-07 RX ORDER — LOSARTAN POTASSIUM AND HYDROCHLOROTHIAZIDE 25; 100 MG/1; MG/1
TABLET ORAL
Qty: 90 TAB | Refills: 3 | Status: SHIPPED | OUTPATIENT
Start: 2020-01-07 | End: 2020-03-08

## 2020-01-07 NOTE — PROGRESS NOTES
1. Have you been to the ER, urgent care clinic since your last visit? Hospitalized since your last visit? No    2. Have you seen or consulted any other health care providers outside of the [address] [place] [address] since your last visit? Include any pap smears or colon screening.  No

## 2020-01-07 NOTE — PROGRESS NOTES
HISTORY OF PRESENT ILLNESS  Kenrick Oliva is a 60 y.o. male. HPI  Patient is here today for evaluation and treatment of: Hypertension/Cholesterol problem    Hypertension:  Initial BP is up. He is on Hyzaar;  Needs a refill on hyzaar    Cholesterol: On fenofibrate for chol;  Pt is fasting. He has also had an elevated blood sugar and is in need of follow up on this      Current Outpatient Medications:     losartan-hydroCHLOROthiazide (HYZAAR) 100-25 mg per tablet, take 1 tablet by mouth once daily, Disp: 90 Tab, Rfl: 3    fenofibrate nanocrystallized (TRICOR) 145 mg tablet, take 1 tablet by mouth daily, Disp: 90 Tab, Rfl: 3    timolol (TIMOPTIC-XE) 0.5 % ophthalmic gel-forming, , Disp: , Rfl: 6    aspirin delayed-release 81 mg tablet, Take 81 mg by mouth daily. , Disp: , Rfl:       Desires a shingles vaccine and series    PMH,  Meds, Allergies, Family History, Social history reviewed    Review of Systems   Constitutional: Negative for chills and fever. Respiratory: Negative for shortness of breath. Cardiovascular: Negative for chest pain.        Physical Exam   Visit Vitals  /68   Pulse 79   Temp 98.6 °F (37 °C) (Oral)   Resp 16   Ht 5' 7\" (1.702 m)   Wt 202 lb (91.6 kg)   SpO2 99%   BMI 31.64 kg/m²     General appearance: alert, cooperative, no distress, appears stated age  Neck: supple, symmetrical, trachea midline, no adenopathy, thyroid: not enlarged, symmetric, no tenderness/mass/nodules, no carotid bruit and no JVD  Lungs: clear to auscultation bilaterally  Heart: regular rate and rhythm, S1, S2 normal, no murmur, click, rub or gallop  Extremities: extremities normal, atraumatic, no cyanosis or edema    Lab Results   Component Value Date/Time    Cholesterol, total 172 01/07/2020 09:39 AM    HDL Cholesterol 46 01/07/2020 09:39 AM    LDL, calculated 89 01/07/2020 09:39 AM    VLDL, calculated 37 (H) 01/07/2020 09:39 AM    Triglyceride 185 (H) 01/07/2020 09:39 AM    CHOL/HDL Ratio 4.1 08/04/2010 04:45 PM     Lab Results   Component Value Date/Time    Sodium 136 01/07/2020 09:39 AM    Potassium 4.5 01/07/2020 09:39 AM    Chloride 95 (L) 01/07/2020 09:39 AM    CO2 26 01/07/2020 09:39 AM    Anion gap 15.0 01/07/2020 09:39 AM    Glucose 113 (H) 01/07/2020 09:39 AM    BUN 19 01/07/2020 09:39 AM    Creatinine 1.2 01/07/2020 09:39 AM    BUN/Creatinine ratio 15 10/26/2011 10:28 AM    GFR est AA 76 10/26/2011 10:28 AM    GFR est non-AA 66 10/26/2011 10:28 AM    Calcium 9.7 01/07/2020 09:39 AM     Lab Results   Component Value Date/Time    Hemoglobin A1c 5.6 01/07/2020 09:39 AM       ASSESSMENT and PLAN    ICD-10-CM ICD-9-CM    1. Essential hypertension R32 516.1 METABOLIC PANEL, BASIC      METABOLIC PANEL, BASIC   2. Hypertriglyceridemia E78.1 272.1 LIPID PANEL      AST      ALT      ALT      AST      LIPID PANEL   3. Elevated blood sugar R73.9 790.29 HEMOGLOBIN A1C W/O EAG      HEMOGLOBIN A1C W/O EAG   4. Encounter for immunization Z23 V03.89 CANCELED: ZOSTER VACC RECOMBINANT ADJUVANTED       Pt ED available via [employer]; References not working in [address] [place] [address] today. treatment plan as listed below  Orders Placed This Encounter    METABOLIC PANEL, BASIC    LIPID PANEL    AST    ALT    HEMOGLOBIN A1C W/O EAG    losartan-hydroCHLOROthiazide (HYZAAR) 100-25 mg per tablet     Follow-up and Dispositions    · Return in about 6 months (around 7/7/2020) for well exam.       An After Visit Summary was printed and given to the patient. This has been fully explained to the patient, who indicates understanding.

## 2020-01-08 LAB
ALT SERPL-CCNC: 16 U/L (ref 5–40)
ANION GAP SERPL CALC-SCNC: 15 MMOL/L
AST SERPL W P-5'-P-CCNC: 22 U/L (ref 10–37)
AVG GLU, 10930: 115 MG/DL (ref 91–123)
BUN SERPL-MCNC: 19 MG/DL (ref 6–22)
CALCIUM SERPL-MCNC: 9.7 MG/DL (ref 8.4–10.5)
CHLORIDE SERPL-SCNC: 95 MMOL/L (ref 98–110)
CHOLEST SERPL-MCNC: 172 MG/DL (ref 110–200)
CO2 SERPL-SCNC: 26 MMOL/L (ref 20–32)
CREAT SERPL-MCNC: 1.2 MG/DL (ref 0.8–1.6)
GFRAA, 66117: >60
GFRNA, 66118: 59.1
GLUCOSE SERPL-MCNC: 113 MG/DL (ref 70–99)
HBA1C MFR BLD HPLC: 5.6 % (ref 4.8–5.6)
HDLC SERPL-MCNC: 3.7 MG/DL (ref 0–5)
HDLC SERPL-MCNC: 46 MG/DL
LDL/HDL RATIO,LDHD: 1.9
LDLC SERPL CALC-MCNC: 89 MG/DL (ref 50–99)
NON-HDL CHOLESTEROL, 011976: 126 MG/DL
POTASSIUM SERPL-SCNC: 4.5 MMOL/L (ref 3.5–5.5)
SODIUM SERPL-SCNC: 136 MMOL/L (ref 133–145)
TRIGL SERPL-MCNC: 185 MG/DL (ref 40–149)
VLDLC SERPL CALC-MCNC: 37 MG/DL (ref 8–30)

## 2020-03-08 RX ORDER — LOSARTAN POTASSIUM AND HYDROCHLOROTHIAZIDE 25; 100 MG/1; MG/1
TABLET ORAL
Qty: 90 TAB | Refills: 3 | Status: SHIPPED | OUTPATIENT
Start: 2020-03-08 | End: 2021-02-23 | Stop reason: SDUPTHER

## 2020-03-09 RX ORDER — LOSARTAN POTASSIUM AND HYDROCHLOROTHIAZIDE 25; 100 MG/1; MG/1
TABLET ORAL
Qty: 90 TAB | Refills: 3 | Status: CANCELLED | OUTPATIENT
Start: 2020-03-09

## 2020-08-28 RX ORDER — FENOFIBRATE 145 MG/1
TABLET, COATED ORAL
Qty: 90 TAB | Refills: 3 | Status: SHIPPED | OUTPATIENT
Start: 2020-08-28 | End: 2020-08-31 | Stop reason: SDUPTHER

## 2020-08-28 NOTE — TELEPHONE ENCOUNTER
Last Visit: 1/7/20 with MD Nikki Charles  Next Appointment: none  Previous Refill Encounter(s): 9/5/19 #90 with 3 refills    Requested Prescriptions     Pending Prescriptions Disp Refills    fenofibrate nanocrystallized (TRICOR) 145 mg tablet 90 Tab 3     Sig: take 1 tablet by mouth daily

## 2020-08-31 RX ORDER — FENOFIBRATE 145 MG/1
TABLET, COATED ORAL
Qty: 90 TAB | Refills: 3 | Status: CANCELLED | OUTPATIENT
Start: 2020-08-31

## 2020-08-31 NOTE — TELEPHONE ENCOUNTER
Requested Prescriptions     Pending Prescriptions Disp Refills    fenofibrate nanocrystallized (TRICOR) 145 mg tablet 90 Tab 3     Sig: take 1 tablet by mouth daily        Pt has r/s ov 12/7/20

## 2020-09-01 RX ORDER — FENOFIBRATE 145 MG/1
TABLET, COATED ORAL
Qty: 90 TAB | Refills: 3 | Status: SHIPPED | OUTPATIENT
Start: 2020-09-01 | End: 2020-12-07 | Stop reason: SDUPTHER

## 2020-09-01 NOTE — TELEPHONE ENCOUNTER
This was e-scribed to the pharmacy on 8/28/20. Patient is asking for a hard copy Rx and he will pick-up.     Requested Prescriptions     Pending Prescriptions Disp Refills    fenofibrate nanocrystallized (TRICOR) 145 mg tablet 90 Tab 3     Sig: take 1 tablet by mouth daily

## 2020-12-07 ENCOUNTER — VIRTUAL VISIT (OUTPATIENT)
Dept: FAMILY MEDICINE CLINIC | Age: 65
End: 2020-12-07

## 2020-12-07 ENCOUNTER — TELEPHONE (OUTPATIENT)
Dept: FAMILY MEDICINE CLINIC | Age: 65
End: 2020-12-07

## 2020-12-07 RX ORDER — FENOFIBRATE 145 MG/1
TABLET, COATED ORAL
Qty: 90 TAB | Refills: 1 | Status: SHIPPED | OUTPATIENT
Start: 2020-12-07 | End: 2021-05-27 | Stop reason: SDUPTHER

## 2020-12-07 NOTE — PROGRESS NOTES
Stephen Georges is a 63 y.o. male, evaluated via audio-only technology on 12/7/2020 for Cough  . Assessment & Plan:     12  Subjective:     Pt presented to office today for a scheduled CPE. During the screening he advised the screener that he had a cough. Pt was advised to have a virtual phone visit at least by this provider before having an in office visit. At this phone encounter pt states he did not really have a cough and tried to explain this to the screener. Advised pt of the screening protocol for the pandemic and advised that pt have covid testing. Pt is upset and thus declines covid testing; states with his job, he has met all protocols and has continued to work. Pt advised of the reasoning of needing testing prior to an in office visit. He states he will not be back until about 6 months as he is unable to take time off from his job. Pt did request a refill on his BP meds . Did oblige. In discussing getting blood test I advised pt that I was unable to guarantee that his urologist with whom he has an appointment with soon will draw blood test from my orders. Pt stated he will get blood work at his next visit. Risks of covid transmission reviewed with pt. Prior to Admission medications    Medication Sig Start Date End Date Taking? Authorizing Provider   fenofibrate nanocrystallized (TRICOR) 145 mg tablet take 1 tablet by mouth daily 9/1/20   Corina Later, MD   losartan-hydroCHLOROthiazide [facility]) 100-25 mg per tablet take 1 tablet by mouth once daily 3/8/20   Corina Later, MD   timolol (TIMOPTIC-XE) 0.5 % ophthalmic gel-forming  1/13/15   Provider, Historical   aspirin delayed-release 81 mg tablet Take 81 mg by mouth daily. Provider, Historical         ROS    No flowsheet data found. Ubaldo Hinson MD     This encounter was created in error - please disregard.

## 2020-12-07 NOTE — TELEPHONE ENCOUNTER
Patient was upset stated that he missed two days pay, patient also states that his cough was a dry one. Patient answered and cleared all Covid 19 questions. The covid  protocol was explained to patient, but he continued to state that his cough was a dry one and he doesn't have covid.  Patient stated that he prefer a phone call not virtual.

## 2021-02-23 RX ORDER — FENOFIBRATE 145 MG/1
TABLET, COATED ORAL
Qty: 90 TAB | Refills: 1 | Status: CANCELLED | OUTPATIENT
Start: 2021-02-23

## 2021-02-23 NOTE — TELEPHONE ENCOUNTER
Patient came in for appt on 12/7/20 but was turned away b/c of a cough    Next Appointment: none  Previous Refill Encounter(s): 3/8/20 #90 with 3 refills    Requested Prescriptions     Pending Prescriptions Disp Refills    losartan-hydroCHLOROthiazide (HYZAAR) 100-25 mg per tablet 90 Tab 1     Sig: Take 1 Tab by mouth daily. Patient still has refills at the pharmacy for Tricor. I contacted the pharmacy to confirm and requested they refill it for the patient.

## 2021-02-26 RX ORDER — LOSARTAN POTASSIUM AND HYDROCHLOROTHIAZIDE 25; 100 MG/1; MG/1
1 TABLET ORAL DAILY
Qty: 90 TAB | Refills: 1 | Status: SHIPPED | OUTPATIENT
Start: 2021-02-26 | End: 2021-12-02

## 2021-03-02 RX ORDER — LOSARTAN POTASSIUM AND HYDROCHLOROTHIAZIDE 25; 100 MG/1; MG/1
TABLET ORAL
Qty: 90 TAB | Refills: 3 | Status: SHIPPED | OUTPATIENT
Start: 2021-03-02 | End: 2021-10-07 | Stop reason: SDUPTHER

## 2021-05-27 ENCOUNTER — VIRTUAL VISIT (OUTPATIENT)
Dept: FAMILY MEDICINE CLINIC | Age: 66
End: 2021-05-27

## 2021-05-27 RX ORDER — LOSARTAN POTASSIUM AND HYDROCHLOROTHIAZIDE 25; 100 MG/1; MG/1
1 TABLET ORAL DAILY
Qty: 90 TABLET | Refills: 1 | Status: CANCELLED | OUTPATIENT
Start: 2021-05-27

## 2021-05-27 NOTE — PROGRESS NOTES
Link sent 8:16 am  This encounter was created in error - please disregard. No connection to the virtual platform was made.

## 2021-05-27 NOTE — TELEPHONE ENCOUNTER
Hyzaar sent on 3/2/21 #90 with 3 refills    Last Visit: today with MD Yin Suresh  Next Appointment: 6/22/21 with MD Yin Suresh  Previous Refill Encounter(s): 12/7/20 #90 with 1 refill    Requested Prescriptions     Pending Prescriptions Disp Refills    fenofibrate nanocrystallized (TRICOR) 145 mg tablet 90 Tablet 1     Sig: take 1 tablet by mouth daily

## 2021-05-27 NOTE — TELEPHONE ENCOUNTER
Requested Prescriptions     Pending Prescriptions Disp Refills    losartan-hydroCHLOROthiazide (HYZAAR) 100-25 mg per tablet 90 Tablet 1     Sig: Take 1 Tablet by mouth daily.     fenofibrate nanocrystallized (TRICOR) 145 mg tablet 90 Tablet 1     Sig: take 1 tablet by mouth daily

## 2021-05-28 RX ORDER — FENOFIBRATE 145 MG/1
TABLET, COATED ORAL
Qty: 90 TABLET | Refills: 1 | Status: SHIPPED | OUTPATIENT
Start: 2021-05-28 | End: 2022-02-14 | Stop reason: SDUPTHER

## 2021-06-22 ENCOUNTER — OFFICE VISIT (OUTPATIENT)
Dept: FAMILY MEDICINE CLINIC | Age: 66
End: 2021-06-22
Payer: COMMERCIAL

## 2021-06-22 VITALS
BODY MASS INDEX: 30.89 KG/M2 | OXYGEN SATURATION: 99 % | RESPIRATION RATE: 18 BRPM | TEMPERATURE: 96.4 F | DIASTOLIC BLOOD PRESSURE: 70 MMHG | SYSTOLIC BLOOD PRESSURE: 140 MMHG | HEART RATE: 68 BPM | WEIGHT: 196.8 LBS | HEIGHT: 67 IN

## 2021-06-22 DIAGNOSIS — R73.9 ELEVATED BLOOD SUGAR: ICD-10-CM

## 2021-06-22 DIAGNOSIS — I10 ESSENTIAL HYPERTENSION: Primary | ICD-10-CM

## 2021-06-22 DIAGNOSIS — E78.1 HYPERTRIGLYCERIDEMIA: ICD-10-CM

## 2021-06-22 PROCEDURE — 99214 OFFICE O/P EST MOD 30 MIN: CPT | Performed by: FAMILY MEDICINE

## 2021-06-22 NOTE — PROGRESS NOTES
Ed Quesada presents today for No chief complaint on file. Is someone accompanying this pt? no    Is the patient using any DME equipment during [address]? no    Depression Screening:  3 most recent PHQ Screens 1/7/2020   Little interest or pleasure in doing things Not at all   Feeling down, depressed, irritable, or hopeless Not at all   Total Score PHQ 2 0   Trouble falling or staying asleep, or sleeping too much -   Feeling tired or having little energy -   Poor appetite, weight loss, or overeating -   Feeling bad about yourself - or that you are a failure or have let yourself or your family down -   Trouble concentrating on things such as school, work, reading, or watching TV -   Moving or speaking so slowly that other people could have noticed; or the opposite being so fidgety that others notice -   Thoughts of being better off dead, or hurting yourself in some way -   How difficult have these problems made it for you to do your work, take care of your home and get along with others -       Learning Assessment:  Learning Assessment 3/31/2014   PRIMARY LEARNER Patient   HIGHEST LEVEL OF EDUCATION - PRIMARY LEARNER  GRADUATED HIGH SCHOOL OR GED   BARRIERS PRIMARY LEARNER NONE   CO-LEARNER CAREGIVER No   PRIMARY LANGUAGE ENGLISH   LEARNER PREFERENCE PRIMARY LISTENING     -     -   ANSWERED BY patient   RELATIONSHIP SELF       Abuse Screening:  Abuse Screening Questionnaire 4/24/2018   Do you ever feel afraid of your partner? N   Are you in a relationship with someone who physically or mentally threatens you? N   Is it safe for you to go home? Y       Fall Risk  No flowsheet data found. ADL  No flowsheet data found. Health Maintenance reviewed and discussed and ordered per Provider. Health Maintenance Due   Topic Date Due    COVID-19 Vaccine (1) Never done    Shingrix Vaccine Age 50> (1 of 2) Never done   . Coordination of Care:  1. Have you been to the ER, urgent care clinic since your last visit? Hospitalized since your last visit? no    2. Have you seen or consulted any other health care providers outside of the [address] [place] [address] since your last visit? Include any pap smears or colon screening.  no

## 2021-06-22 NOTE — PATIENT INSTRUCTIONS
DASH Diet: Care Instructions Your Care Instructions The DASH diet is an eating plan that can help lower your blood pressure. DASH stands for Dietary Approaches to Stop Hypertension. Hypertension is high blood pressure. The DASH diet focuses on eating foods that are high in calcium, potassium, and magnesium. These nutrients can lower blood pressure. The foods that are highest in these nutrients are fruits, vegetables, low-fat dairy products, nuts, seeds, and legumes. But taking calcium, potassium, and magnesium supplements instead of eating foods that are high in those nutrients does not have the same effect. The DASH diet also includes whole grains, fish, and poultry. The DASH diet is one of several lifestyle changes your doctor may recommend to lower your high blood pressure. Your doctor may also want you to decrease the amount of sodium in your diet. Lowering sodium while following the DASH diet can lower blood pressure even further than just the DASH diet alone. Follow-up care is a key part of your treatment and safety. Be sure to make and go to all appointments, and call your doctor if you are having problems. It's also a good idea to know your test results and keep a list of the medicines you take. How can you care for yourself at home? Following the DASH diet · Eat 4 to 5 servings of fruit each day. A serving is 1 medium-sized piece of fruit, ½ cup chopped or canned fruit, 1/4 cup dried fruit, or 4 ounces (½ cup) of fruit juice. Choose fruit more often than fruit juice. · Eat 4 to 5 servings of vegetables each day. A serving is 1 cup of lettuce or raw leafy vegetables, ½ cup of chopped or cooked vegetables, or 4 ounces (½ cup) of vegetable juice. Choose vegetables more often than vegetable juice. · Get 2 to 3 servings of low-fat and fat-free dairy each day. A serving is 8 ounces of milk, 1 cup of yogurt, or 1 ½ ounces of cheese. · Eat 6 to 8 servings of grains each day.  A serving is 1 slice of bread, 1 ounce of dry cereal, or ½ cup of cooked rice, pasta, or cooked cereal. Try to choose whole-grain products as much as possible. · Limit lean meat, poultry, and fish to 2 servings each day. A serving is 3 ounces, about the size of a deck of cards. · Eat 4 to 5 servings of nuts, seeds, and legumes (cooked dried beans, lentils, and split peas) each week. A serving is 1/3 cup of nuts, 2 tablespoons of seeds, or ½ cup of cooked beans or peas. · Limit fats and oils to 2 to 3 servings each day. A serving is 1 teaspoon of vegetable oil or 2 tablespoons of salad dressing. · Limit sweets and added sugars to 5 servings or less a week. A serving is 1 tablespoon jelly or jam, ½ cup sorbet, or 1 cup of lemonade. · Eat less than 2,300 milligrams (mg) of sodium a day. If you limit your sodium to 1,500 mg a day, you can lower your blood pressure even more. · Be aware that all of these are the suggested number of servings for people who eat 1,800 to 2,000 calories a day. Your recommended number of servings may be different if you need more or fewer calories. Tips for success · Start small. Do not try to make dramatic changes to your diet all at once. You might feel that you are missing out on your favorite foods and then be more likely to not follow the plan. Make small changes, and stick with them. Once those changes become habit, add a few more changes. · Try some of the following: ? Make it a goal to eat a fruit or vegetable at every meal and at snacks. This will make it easy to get the recommended amount of fruits and vegetables each day. ? Try yogurt topped with fruit and nuts for a snack or healthy dessert. ? Add lettuce, tomato, cucumber, and onion to sandwiches. ? Combine a ready-made pizza crust with low-fat mozzarella cheese and lots of vegetable toppings. Try using tomatoes, squash, spinach, broccoli, carrots, cauliflower, and onions. ?  Have a variety of cut-up vegetables with a low-fat dip as an appetizer instead of chips and dip. ? Sprinkle sunflower seeds or chopped almonds over salads. Or try adding chopped walnuts or almonds to cooked vegetables. ? Try some vegetarian meals using beans and peas. Add garbanzo or kidney beans to salads. Make burritos and tacos with mashed medina beans or black beans. Where can you learn more? Go to http://www.gray.com/ Enter G995 in the search box to learn more about \"DASH Diet: Care Instructions. \" Current as of: August 31, 2020               Content Version: 12.8 © 9535-3632 [employer]. Care instructions adapted under license by [employer] (which disclaims liability or warranty for this information). If you have questions about a medical condition or this instruction, always ask your healthcare professional. [address] any warranty or liability for your use of this information.

## 2021-06-22 NOTE — PROGRESS NOTES
HPI:  Lorraine Alberto is a 61 y.o. male who presents today with   Chief Complaint   Patient presents with    Hypertension     f/u        HTN:  Initial BP is up. His blood pressure is better some at next check. Pt feels well; No new complaints. He does not need any refills currently. Patient is active on his job. Not do any other exercises outside of his job. BP Readings from Last 3 Encounters:   06/22/21 (!) 140/70   02/17/20 126/74   01/07/20 130/68     Wt Readings from Last 3 Encounters:   06/22/21 196 lb 12.8 oz (89.3 kg)   04/09/21 195 lb (88.5 kg)   02/17/20 202 lb (91.6 kg)         Pt has hypertriglyceridemia: on fenofibrate. 3 most recent PHQ Screens 6/22/2021   Little interest or pleasure in doing things Not at all   Feeling down, depressed, irritable, or hopeless Not at all   Total Score PHQ 2 0   Trouble falling or staying asleep, or sleeping too much -   Feeling tired or having little energy -   Poor appetite, weight loss, or overeating -   Feeling bad about yourself - or that you are a failure or have let yourself or your family down -   Trouble concentrating on things such as school, work, reading, or watching TV -   Moving or speaking so slowly that other people could have noticed; or the opposite being so fidgety that others notice -   Thoughts of being better off dead, or hurting yourself in some way -   How difficult have these problems made it for you to do your work, take care of your home and get along with others -               PMH,  Meds, Allergies, Family History, Social history reviewed      Current Outpatient Medications   Medication Sig Dispense Refill    fenofibrate nanocrystallized (TRICOR) 145 mg tablet take 1 tablet by mouth daily 90 Tablet 1    timolol (TIMOPTIC) 0.5 % ophthalmic solution PLACE 1 DROP IN EACH EYE EVERY MORNING      losartan-hydroCHLOROthiazide (HYZAAR) 100-25 mg per tablet Take 1 Tab by mouth daily.  90 Tab 1    aspirin delayed-release 81 mg tablet Take 81 mg by mouth daily.  losartan-hydroCHLOROthiazide (HYZAAR) 100-25 mg per tablet take 1 tablet by mouth once daily 90 Tab 3    timolol (TIMOPTIC-XE) 0.5 % ophthalmic gel-forming   6        No Known Allergies               ROS as per HPI      Visit Vitals  BP (!) 140/70   Pulse 68   Temp (!) 96.4 °F (35.8 °C) (Temporal)   Resp 18   Ht 5' 7\" (1.702 m)   Wt 196 lb 12.8 oz (89.3 kg)   SpO2 99%   BMI 30.82 kg/m²     Physical Exam   Visit Vitals  BP (!) 140/70   Pulse 68   Temp (!) 96.4 °F (35.8 °C) (Temporal)   Resp 18   Ht 5' 7\" (1.702 m)   Wt 196 lb 12.8 oz (89.3 kg)   SpO2 99%   BMI 30.82 kg/m²     General appearance: alert, cooperative, no distress, appears stated age  Neck: supple, symmetrical, trachea midline, no adenopathy, thyroid: not enlarged, symmetric, no tenderness/mass/nodules, no carotid bruit and no JVD  Lungs: clear to auscultation bilaterally  Heart: regular rate and rhythm, S1, S2 normal, no murmur, click, rub or gallop    Extremities: extremities normal, atraumatic, no cyanosis or edema    Lab Results   Component Value Date/Time    Cholesterol, total 172 01/07/2020 09:39 AM    HDL Cholesterol 46 01/07/2020 09:39 AM    LDL, calculated 89 01/07/2020 09:39 AM    VLDL, calculated 37 (H) 01/07/2020 09:39 AM    Triglyceride 185 (H) 01/07/2020 09:39 AM    CHOL/HDL Ratio 4.1 08/04/2010 04:45 PM     Lab Results   Component Value Date/Time    Sodium 136 01/07/2020 09:39 AM    Potassium 4.5 01/07/2020 09:39 AM    Chloride 95 (L) 01/07/2020 09:39 AM    CO2 26 01/07/2020 09:39 AM    Anion gap 15.0 01/07/2020 09:39 AM    Glucose 113 (H) 01/07/2020 09:39 AM    BUN 19 01/07/2020 09:39 AM    Creatinine 1.2 01/07/2020 09:39 AM    BUN/Creatinine ratio 15 10/26/2011 10:28 AM    GFR est AA 76 10/26/2011 10:28 AM    GFR est non-AA 66 10/26/2011 10:28 AM    Calcium 9.7 01/07/2020 09:39 AM       Assessment/Plan:  Diagnoses and all orders for this visit:    1.  Essential hypertension  -     METABOLIC PANEL, COMPREHENSIVE; Future    2. Hypertriglyceridemia  -     LIPID PANEL; Future    3. Elevated blood sugar  -     HEMOGLOBIN A1C WITH EAG; Future      As above  Blood pressure is moderately controlled but slightly elevated still. Lifestyle changes will be instituted to see if blood pressure can be further managed. Patient agrees that if blood pressures not improved at follow-up that an additional medicine may need to be added. A lower sodium diet has been advised   treatment plan as listed below  Orders Placed This Encounter    LIPID PANEL    METABOLIC PANEL, COMPREHENSIVE    HEMOGLOBIN A1C WITH EAG     Follow-up and Dispositions    · Return in about 3 months (around 9/22/2021) for htn. This has been fully explained to the patient, who indicates understanding. An After Visit Summary was printed and given to the patient. Follow-up and Dispositions    · Return in about 3 months (around 9/22/2021) for htn.             Alanis Matias MD

## 2021-06-23 LAB
A-G RATIO,AGRAT: 1.9 RATIO (ref 1.1–2.6)
ALBUMIN SERPL-MCNC: 4.7 G/DL (ref 3.5–5)
ALP SERPL-CCNC: 46 U/L (ref 40–125)
ALT SERPL-CCNC: 20 U/L (ref 5–40)
ANION GAP SERPL CALC-SCNC: 10 MMOL/L (ref 3–15)
AST SERPL W P-5'-P-CCNC: 26 U/L (ref 10–37)
AVG GLU, 10930: 120 MG/DL (ref 91–123)
BILIRUB SERPL-MCNC: 0.5 MG/DL (ref 0.2–1.2)
BUN SERPL-MCNC: 29 MG/DL (ref 6–22)
CALCIUM SERPL-MCNC: 10.4 MG/DL (ref 8.4–10.5)
CHLORIDE SERPL-SCNC: 100 MMOL/L (ref 98–110)
CHOLEST SERPL-MCNC: 219 MG/DL (ref 110–200)
CO2 SERPL-SCNC: 27 MMOL/L (ref 20–32)
CREAT SERPL-MCNC: 1.4 MG/DL (ref 0.8–1.6)
GFRAA, 66117: >60
GFRNA, 66118: 49.8
GLOBULIN,GLOB: 2.5 G/DL (ref 2–4)
GLUCOSE SERPL-MCNC: 112 MG/DL (ref 70–99)
HBA1C MFR BLD HPLC: 5.8 % (ref 4.8–5.6)
HDLC SERPL-MCNC: 4.1 MG/DL (ref 0–5)
HDLC SERPL-MCNC: 53 MG/DL
LDL/HDL RATIO,LDHD: 2.5
LDLC SERPL CALC-MCNC: 134 MG/DL (ref 50–99)
NON-HDL CHOLESTEROL, 011976: 166 MG/DL
POTASSIUM SERPL-SCNC: 6 MMOL/L (ref 3.5–5.5)
PROT SERPL-MCNC: 7.2 G/DL (ref 6.2–8.1)
SODIUM SERPL-SCNC: 137 MMOL/L (ref 133–145)
TRIGL SERPL-MCNC: 159 MG/DL (ref 40–149)
VLDLC SERPL CALC-MCNC: 32 MG/DL (ref 8–30)

## 2021-08-27 RX ORDER — FENOFIBRATE 145 MG/1
TABLET, COATED ORAL
Qty: 90 TABLET | Refills: 1 | Status: CANCELLED | OUTPATIENT
Start: 2021-08-27

## 2021-08-27 RX ORDER — LOSARTAN POTASSIUM AND HYDROCHLOROTHIAZIDE 25; 100 MG/1; MG/1
1 TABLET ORAL DAILY
Qty: 90 TABLET | Refills: 3 | Status: CANCELLED | OUTPATIENT
Start: 2021-08-27

## 2021-10-07 ENCOUNTER — OFFICE VISIT (OUTPATIENT)
Dept: FAMILY MEDICINE CLINIC | Age: 66
End: 2021-10-07
Payer: COMMERCIAL

## 2021-10-07 VITALS
SYSTOLIC BLOOD PRESSURE: 130 MMHG | HEART RATE: 80 BPM | RESPIRATION RATE: 12 BRPM | OXYGEN SATURATION: 100 % | BODY MASS INDEX: 30.32 KG/M2 | DIASTOLIC BLOOD PRESSURE: 64 MMHG | WEIGHT: 193.2 LBS | TEMPERATURE: 96.8 F | HEIGHT: 67 IN

## 2021-10-07 DIAGNOSIS — I10 HTN (HYPERTENSION), BENIGN: Primary | ICD-10-CM

## 2021-10-07 DIAGNOSIS — E87.5 HYPERKALEMIA: ICD-10-CM

## 2021-10-07 PROCEDURE — 99213 OFFICE O/P EST LOW 20 MIN: CPT | Performed by: FAMILY MEDICINE

## 2021-10-07 NOTE — PROGRESS NOTES
HPI:  Yifan Dao is a 61 y.o. male who presents today with   Chief Complaint   Patient presents with    Hypertension     3m f/u         Has HTN ; on Hyzaar; compliant with med. BP better at second check;     BPs at home yesterday were 205/50, 040 systolic    He has no new complaints  He attempts a lower chol diet  Advised a lower sugar diet as well as his A1c has been elevated. Lab Results   Component Value Date/Time    Cholesterol, total 219 (H) 06/22/2021 09:05 AM    HDL Cholesterol 53 06/22/2021 09:05 AM    LDL, calculated 134 (H) 06/22/2021 09:05 AM    VLDL, calculated 32 (H) 06/22/2021 09:05 AM    Triglyceride 159 (H) 06/22/2021 09:05 AM    CHOL/HDL Ratio 4.1 08/04/2010 04:45 PM       Lab Results   Component Value Date/Time    Sodium 137 06/22/2021 09:05 AM    Potassium 6.0 (H) 06/22/2021 09:05 AM    Chloride 100 06/22/2021 09:05 AM    CO2 27 06/22/2021 09:05 AM    Anion gap 10.0 06/22/2021 09:05 AM    Glucose 112 (H) 06/22/2021 09:05 AM    BUN 29 (H) 06/22/2021 09:05 AM    Creatinine 1.4 06/22/2021 09:05 AM    BUN/Creatinine ratio 15 10/26/2011 10:28 AM    GFR est AA 76 10/26/2011 10:28 AM    GFR est non-AA 66 10/26/2011 10:28 AM    Calcium 10.4 06/22/2021 09:05 AM    Bilirubin, total 0.5 06/22/2021 09:05 AM    Alk.  phosphatase 46 06/22/2021 09:05 AM    Protein, total 7.2 06/22/2021 09:05 AM    Albumin 4.7 06/22/2021 09:05 AM    Globulin 2.5 06/22/2021 09:05 AM    A-G Ratio 1.9 06/22/2021 09:05 AM    ALT (SGPT) 20 06/22/2021 09:05 AM    AST (SGOT) 26 06/22/2021 09:05 AM           3 most recent PHQ Screens 10/7/2021   Little interest or pleasure in doing things Not at all   Feeling down, depressed, irritable, or hopeless Not at all   Total Score PHQ 2 0   Trouble falling or staying asleep, or sleeping too much -   Feeling tired or having little energy -   Poor appetite, weight loss, or overeating -   Feeling bad about yourself - or that you are a failure or have let yourself or your family down - Trouble concentrating on things such as school, work, reading, or watching TV -   Moving or speaking so slowly that other people could have noticed; or the opposite being so fidgety that others notice -   Thoughts of being better off dead, or hurting yourself in some way -   How difficult have these problems made it for you to do your work, take care of your home and get along with others -               PMH,  Meds, Allergies, Family History, Social history reviewed      Current Outpatient Medications   Medication Sig Dispense Refill    fenofibrate nanocrystallized (TRICOR) 145 mg tablet take 1 tablet by mouth daily 90 Tablet 1    timolol (TIMOPTIC) 0.5 % ophthalmic solution PLACE 1 DROP IN EACH EYE EVERY MORNING      losartan-hydroCHLOROthiazide (HYZAAR) 100-25 mg per tablet Take 1 Tab by mouth daily. 90 Tab 1    aspirin delayed-release 81 mg tablet Take 81 mg by mouth daily. No Known Allergies               Review of Systems   Respiratory: Negative for shortness of breath. Cardiovascular: Negative for chest pain. Musculoskeletal: Positive for neck pain (managing ok. ). Visit Vitals  /64   Pulse 80   Temp 96.8 °F (36 °C) (Temporal)   Resp 12   Ht 5' 7\" (1.702 m)   Wt 193 lb 3.2 oz (87.6 kg)   SpO2 100%   BMI 30.26 kg/m²     Physical Exam   General appearance: alert, cooperative, no distress, appears stated age  Neck: supple, symmetrical, trachea midline, no adenopathy, thyroid: not enlarged, symmetric, no tenderness/mass/nodules, no carotid bruit and no JVD  Lungs: clear to auscultation bilaterally  Heart: regular rate and rhythm, S1, S2 normal, no murmur, click, rub or gallop  Extremities: extremities normal, atraumatic, no cyanosis or edema      Assessment/Plan:    Diagnoses and all orders for this visit:    1. HTN (hypertension), benign    2. Hyperkalemia  -     POTASSIUM;  Future      As above, BP stable  Continue Hyzaar  Repeat potassium level  An After Visit Summary was printed and given to the patient. This has been fully explained to the patient, who indicates understanding.       Follow-up and Dispositions    · Return in about 4 months (around 2/7/2022) for well exam.            Cathy Álvarez MD

## 2021-10-07 NOTE — PROGRESS NOTES
Chief Complaint   Patient presents with    Hypertension     3m f/u        Pt preferred language for health care discussion is english. Is someone accompanying this pt? no    Is the patient using any DME equipment during [address]? no    Depression Screening:  3 most recent [facility] Screens 10/7/2021 6/22/2021 1/7/2020 7/3/2019 8/23/2018 6/7/2017 9/2/2015   Little interest or pleasure in doing things Not at all Not at all Not at all Not at all Not at all Not at all Not at all   Feeling down, depressed, irritable, or hopeless Not at all Not at all Not at all Not at all Not at all Not at all Not at all   Total Score PHQ 2 0 0 0 0 0 0 0   Trouble falling or staying asleep, or sleeping too much - - - - - - -   Feeling tired or having little energy - - - - - - -   Poor appetite, weight loss, or overeating - - - - - - -   Feeling bad about yourself - or that you are a failure or have let yourself or your family down - - - - - - -   Trouble concentrating on things such as school, work, reading, or watching TV - - - - - - -   Moving or speaking so slowly that other people could have noticed; or the opposite being so fidgety that others notice - - - - - - -   Thoughts of being better off dead, or hurting yourself in some way - - - - - - -   How difficult have these problems made it for you to do your work, take care of your home and get along with others - - - - - - -       Learning Assessment:  Learning Assessment 3/31/2014 3/28/2013   PRIMARY LEARNER Patient Patient   HIGHEST LEVEL OF EDUCATION - PRIMARY LEARNER  GRADUATED HIGH SCHOOL OR GED -   BARRIERS PRIMARY LEARNER NONE -   CO-LEARNER CAREGIVER No -   PRIMARY LANGUAGE ENGLISH ENGLISH   LEARNER PREFERENCE PRIMARY LISTENING DEMONSTRATION     - LISTENING     - READING   ANSWERED BY patient patient   RELATIONSHIP SELF SELF         Health Maintenance reviewed and discussed per provider. Yes        Advance Directive:  1. Do you have an advance directive in place?  Patient Reply:no    2. If not, would you like material regarding how to put one in place? Patient Reply: yes    Coordination of Care:  1. Have you been to the ER, urgent care clinic since your last visit? Hospitalized since your last visit? no    2. Have you seen or consulted any other health care providers outside of the [address] [place] [address] since your last visit? Include any pap smears or colon screening.  no

## 2021-10-07 NOTE — PATIENT INSTRUCTIONS
DASH Diet: Care Instructions  Your Care Instructions     The DASH diet is an eating plan that can help lower your blood pressure. DASH stands for Dietary Approaches to Stop Hypertension. Hypertension is high blood pressure. The DASH diet focuses on eating foods that are high in calcium, potassium, and magnesium. These nutrients can lower blood pressure. The foods that are highest in these nutrients are fruits, vegetables, low-fat dairy products, nuts, seeds, and legumes. But taking calcium, potassium, and magnesium supplements instead of eating foods that are high in those nutrients does not have the same effect. The DASH diet also includes whole grains, fish, and poultry. The DASH diet is one of several lifestyle changes your doctor may recommend to lower your high blood pressure. Your doctor may also want you to decrease the amount of sodium in your diet. Lowering sodium while following the DASH diet can lower blood pressure even further than just the DASH diet alone. Follow-up care is a key part of your treatment and safety. Be sure to make and go to all appointments, and call your doctor if you are having problems. It's also a good idea to know your test results and keep a list of the medicines you take. How can you care for yourself at home? Following the DASH diet  · Eat 4 to 5 servings of fruit each day. A serving is 1 medium-sized piece of fruit, ½ cup chopped or canned fruit, 1/4 cup dried fruit, or 4 ounces (½ cup) of fruit juice. Choose fruit more often than fruit juice. · Eat 4 to 5 servings of vegetables each day. A serving is 1 cup of lettuce or raw leafy vegetables, ½ cup of chopped or cooked vegetables, or 4 ounces (½ cup) of vegetable juice. Choose vegetables more often than vegetable juice. · Get 2 to 3 servings of low-fat and fat-free dairy each day. A serving is 8 ounces of milk, 1 cup of yogurt, or 1 ½ ounces of cheese. · Eat 6 to 8 servings of grains each day.  A serving is 1 slice of bread, 1 ounce of dry cereal, or ½ cup of cooked rice, pasta, or cooked cereal. Try to choose whole-grain products as much as possible. · Limit lean meat, poultry, and fish to 2 servings each day. A serving is 3 ounces, about the size of a deck of cards. · Eat 4 to 5 servings of nuts, seeds, and legumes (cooked dried beans, lentils, and split peas) each week. A serving is 1/3 cup of nuts, 2 tablespoons of seeds, or ½ cup of cooked beans or peas. · Limit fats and oils to 2 to 3 servings each day. A serving is 1 teaspoon of vegetable oil or 2 tablespoons of salad dressing. · Limit sweets and added sugars to 5 servings or less a week. A serving is 1 tablespoon jelly or jam, ½ cup sorbet, or 1 cup of lemonade. · Eat less than 2,300 milligrams (mg) of sodium a day. If you limit your sodium to 1,500 mg a day, you can lower your blood pressure even more. · Be aware that all of these are the suggested number of servings for people who eat 1,800 to 2,000 calories a day. Your recommended number of servings may be different if you need more or fewer calories. Tips for success  · Start small. Do not try to make dramatic changes to your diet all at once. You might feel that you are missing out on your favorite foods and then be more likely to not follow the plan. Make small changes, and stick with them. Once those changes become habit, add a few more changes. · Try some of the following:  ? Make it a goal to eat a fruit or vegetable at every meal and at snacks. This will make it easy to get the recommended amount of fruits and vegetables each day. ? Try yogurt topped with fruit and nuts for a snack or healthy dessert. ? Add lettuce, tomato, cucumber, and onion to sandwiches. ? Combine a ready-made pizza crust with low-fat mozzarella cheese and lots of vegetable toppings. Try using tomatoes, squash, spinach, broccoli, carrots, cauliflower, and onions. ?  Have a variety of cut-up vegetables with a low-fat dip as an appetizer instead of chips and dip. ? Sprinkle sunflower seeds or chopped almonds over salads. Or try adding chopped walnuts or almonds to cooked vegetables. ? Try some vegetarian meals using beans and peas. Add garbanzo or kidney beans to salads. Make burritos and tacos with mashed medina beans or black beans. Where can you learn more? Go to http://www.orellana.com/  Enter H967 in the search box to learn more about \"DASH Diet: Care Instructions. \"  Current as of: April 29, 2021               Content Version: 13.0  © 1084-8165 [employer]. Care instructions adapted under license by [employer] (which disclaims liability or warranty for this information). If you have questions about a medical condition or this instruction, always ask your healthcare professional. [address] any warranty or liability for your use of this information.

## 2021-10-08 LAB — POTASSIUM SERPL-SCNC: 4.7 MMOL/L (ref 3.5–5.5)

## 2021-11-30 NOTE — TELEPHONE ENCOUNTER
Last Visit: 10/7/21 with MD Aureliano Woo  Next Appointment: 2/14/22 with MD Aureliano Woo  Previous Refill Encounter(s): 2/26/21 #90 with 1 refill    Requested Prescriptions     Pending Prescriptions Disp Refills    losartan-hydroCHLOROthiazide (HYZAAR) 100-25 mg per tablet [Pharmacy Med Name: LOSARTAN-HCTZ 100-25 MG TAB] 90 Tablet 1     Sig: take 1 tablet by mouth once daily

## 2021-12-02 RX ORDER — LOSARTAN POTASSIUM AND HYDROCHLOROTHIAZIDE 25; 100 MG/1; MG/1
TABLET ORAL
Qty: 90 TABLET | Refills: 1 | Status: SHIPPED | OUTPATIENT
Start: 2021-12-02 | End: 2022-02-14 | Stop reason: SDUPTHER

## 2022-02-14 ENCOUNTER — OFFICE VISIT (OUTPATIENT)
Dept: FAMILY MEDICINE CLINIC | Age: 67
End: 2022-02-14
Payer: COMMERCIAL

## 2022-02-14 VITALS
DIASTOLIC BLOOD PRESSURE: 74 MMHG | WEIGHT: 193.6 LBS | HEART RATE: 80 BPM | BODY MASS INDEX: 30.39 KG/M2 | OXYGEN SATURATION: 98 % | RESPIRATION RATE: 12 BRPM | TEMPERATURE: 96.8 F | SYSTOLIC BLOOD PRESSURE: 140 MMHG | HEIGHT: 67 IN

## 2022-02-14 DIAGNOSIS — Z00.00 WELL ADULT EXAM: Primary | ICD-10-CM

## 2022-02-14 DIAGNOSIS — Z23 ENCOUNTER FOR IMMUNIZATION: ICD-10-CM

## 2022-02-14 DIAGNOSIS — I10 HTN (HYPERTENSION), BENIGN: ICD-10-CM

## 2022-02-14 PROCEDURE — 90750 HZV VACC RECOMBINANT IM: CPT | Performed by: FAMILY MEDICINE

## 2022-02-14 PROCEDURE — 90732 PPSV23 VACC 2 YRS+ SUBQ/IM: CPT | Performed by: FAMILY MEDICINE

## 2022-02-14 PROCEDURE — 99397 PER PM REEVAL EST PAT 65+ YR: CPT | Performed by: FAMILY MEDICINE

## 2022-02-14 RX ORDER — FENOFIBRATE 145 MG/1
TABLET, COATED ORAL
Qty: 90 TABLET | Refills: 1 | Status: SHIPPED | OUTPATIENT
Start: 2022-02-14 | End: 2022-02-18

## 2022-02-14 RX ORDER — LOSARTAN POTASSIUM AND HYDROCHLOROTHIAZIDE 25; 100 MG/1; MG/1
1 TABLET ORAL DAILY
Qty: 90 TABLET | Refills: 1 | Status: SHIPPED | OUTPATIENT
Start: 2022-02-14 | End: 2022-08-23

## 2022-02-14 NOTE — PROGRESS NOTES
Subjective:     Lady Joseph is a 74 y.o. male presenting for annual exam and complete physical.    BP initially up; blood pressure slightly better at second check. He is on medications as listed below. He tolerates his medications well. Patient Active Problem List    Diagnosis Date Noted    Unspecified hyperplasia of prostate with urinary obstruction and other lower urinary tract symptoms (LUTS)     HTN (hypertension), benign 01/11/2010    Allergic rhinitis 01/11/2010    Glaucoma 01/11/2010    Hypertriglyceridemia 01/11/2010     Current Outpatient Medications   Medication Sig Dispense Refill    losartan-hydroCHLOROthiazide (HYZAAR) 100-25 mg per tablet Take 1 Tablet by mouth daily. 90 Tablet 1    timolol (TIMOPTIC) 0.5 % ophthalmic solution PLACE 1 DROP IN EACH EYE EVERY MORNING      aspirin delayed-release 81 mg tablet Take 81 mg by mouth daily.       fenofibrate nanocrystallized (TRICOR) 145 mg tablet take 1 tablet by mouth once daily 90 Tablet 1     No Known Allergies  Past Medical History:   Diagnosis Date    Allergic rhinitis     environmental    Allergic rhinitis 1/11/2010    Arthritis     neck, shoulder/ chiropractor    Essential hypertension, benign     Glaucoma 1/11/2010    Glaucoma 1/11/2010    HTN (hypertension), benign 1/11/2010    Hypertriglyceridemia      Past Surgical History:   Procedure Laterality Date    COLONOSCOPY  11/07    due Nov 2012    HX HERNIA REPAIR  1975    left groin     Family History   Problem Relation Age of Onset    Diabetes Mother     Cancer Mother         bone ca    Diabetes Father      Social History     Tobacco Use    Smoking status: Never Smoker    Smokeless tobacco: Never Used   Substance Use Topics    Alcohol use: Yes     Comment: occ beer        Lab Results   Component Value Date/Time    Hemoglobin A1c 5.8 (H) 06/22/2021 09:05 AM    Hemoglobin A1c 5.6 01/07/2020 09:39 AM    Hemoglobin A1c 5.3 07/03/2019 09:26 AM    Glucose 112 (H) 06/22/2021 09:05 AM    LDL, calculated 134 (H) 06/22/2021 09:05 AM    Creatinine 1.4 06/22/2021 09:05 AM      Lab Results   Component Value Date/Time    Cholesterol, total 219 (H) 06/22/2021 09:05 AM    HDL Cholesterol 53 06/22/2021 09:05 AM    LDL, calculated 134 (H) 06/22/2021 09:05 AM    Triglyceride 159 (H) 06/22/2021 09:05 AM    CHOL/HDL Ratio 4.1 08/04/2010 04:45 PM        Review of Systems  A comprehensive review of systems was negative except for that written in the HPI. Objective:     Visit Vitals  BP (!) 140/74   Pulse 80   Temp 96.8 °F (36 °C) (Temporal)   Resp 12   Ht 5' 7\" (1.702 m)   Wt 193 lb 9.6 oz (87.8 kg)   SpO2 98%   BMI 30.32 kg/m²     Physical exam:   Visit Vitals  BP (!) 140/74   Pulse 80   Temp 96.8 °F (36 °C) (Temporal)   Resp 12   Ht 5' 7\" (1.702 m)   Wt 193 lb 9.6 oz (87.8 kg)   SpO2 98%   BMI 30.32 kg/m²     General appearance: alert, cooperative, no distress, appears stated age  Neck: supple, symmetrical, trachea midline, no adenopathy, thyroid: not enlarged, symmetric, no tenderness/mass/nodules, no carotid bruit and no JVD  Lungs: clear to auscultation bilaterally      Heart: regular rate and rhythm, S1, S2 normal, no murmur, click, rub or gallop    Extremities: extremities normal, atraumatic, no cyanosis or edema      Assessment/Plan:         ICD-10-CM ICD-9-CM    1. HTN (hypertension), benign  I10 401.1    2. Encounter for immunization  Z23 V03.89 PNEUMOCOCCAL POLYSACCHARIDE VACCINE, 23-VALENT, ADULT OR IMMUNOSUPPRESSED PT DOSE,      ZOSTER VACC RECOMBINANT ADJUVANTED   . As above   Pneumonia and shingles  vaccinations today  blood pressure slightly elevated; encouraged lifestyle changes to control blood pressure  Follow-up and Dispositions    · Return in about 4 months (around 6/14/2022) for htn- 2nd shingles. This has been fully explained to the patient, who indicates understanding. An After Visit Summary was printed and given to the patient.

## 2022-02-14 NOTE — PROGRESS NOTES
1. \"Have you been to the ER, urgent care clinic since your last visit? Hospitalized since your last visit? \" No    2. \"Have you seen or consulted any other health care providers outside of the [address] [place] [address] since your last visit? \" Yes     3. For patients aged 39-70: Has the patient had a colonoscopy / FIT/ Cologuard? Yes - no Care Gap present    Patient received SHINGRIX vaccine 0.5ml in left arm. Tolerated well. No signs or symptoms of distress noted. Most current VIS given and consent signed. Patient received Pneumococcal 23 vaccine 0.5ml in right deltoid. Tolerated well. No signs or symptoms of distress noted. Most current VIS given and consent signed.

## 2022-02-14 NOTE — PATIENT INSTRUCTIONS
Well Visit, Over 72: Care Instructions  Overview     Well visits can help you stay healthy. Your doctor has checked your overall health and may have suggested ways to take good care of yourself. Your doctor also may have recommended tests. At home, you can help prevent illness with healthy eating, regular exercise, and other steps. Follow-up care is a key part of your treatment and safety. Be sure to make and go to all appointments, and call your doctor if you are having problems. It's also a good idea to know your test results and keep a list of the medicines you take. How can you care for yourself at home? · Get screening tests that you and your doctor decide on. Screening helps find diseases before any symptoms appear. · Eat healthy foods. Choose fruits, vegetables, whole grains, protein, and low-fat dairy foods. Limit fat, especially saturated fat. Reduce salt in your diet. · Limit alcohol. If you are a man, have no more than 2 drinks a day or 14 drinks a week. If you are a woman, have no more than 1 drink a day or 7 drinks a week. Since alcohol affects older adults differently, you may want to limit alcohol even more. Or you may not want to drink at all. · Get at least 30 minutes of exercise on most days of the week. Walking is a good choice. You also may want to do other activities, such as running, swimming, cycling, or playing tennis or team sports. · Reach and stay at a healthy weight. This will lower your risk for many problems, such as obesity, diabetes, heart disease, and high blood pressure. · Do not smoke. Smoking can make health problems worse. If you need help quitting, talk to your doctor about stop-smoking programs and medicines. These can increase your chances of quitting for good. · Care for your mental health. It is easy to get weighed down by worry and stress. Learn strategies to manage stress, like deep breathing and mindfulness, and stay connected with your family and community. If you find you often feel sad or hopeless, talk with your doctor. Treatment can help. · Talk to your doctor about whether you have any risk factors for sexually transmitted infections (STIs). You can help prevent STIs if you wait to have sex with a new partner (or partners) until you've each been tested for STIs. It also helps if you use condoms (male or female condoms) and if you limit your sex partners to one person who only has sex with you. Vaccines are available for some STIs. · If you think you may have a problem with alcohol or drug use, talk to your doctor. This includes prescription medicines (such as amphetamines and opioids) and illegal drugs (such as cocaine and methamphetamine). Your doctor can help you figure out what type of treatment is best for you. · Protect your skin from too much sun. When you're outdoors from 10 a.m. to 4 p.m., stay in the shade or cover up with clothing and a hat with a wide brim. Wear sunglasses that block UV rays. Even when it's cloudy, put broad-spectrum sunscreen (SPF 30 or higher) on any exposed skin. · See a dentist one or two times a year for checkups and to have your teeth cleaned. · Wear a seat belt in the car. When should you call for help? Watch closely for changes in your health, and be sure to contact your doctor if you have any problems or symptoms that concern you. Where can you learn more? Go to http://www.gray.com/  Enter [id number] in the search box to learn more about \"Well Visit, Over 65: Care Instructions. \"  Current as of: February 11, 2021               Content Version: 13.0  © 4741-1175 Healthwise, Incorporated. Care instructions adapted under license by [employer] (which disclaims liability or warranty for this information).  If you have questions about a medical condition or this instruction, always ask your healthcare professional. [address] any warranty or liability for your use of this information. Pneumococcal Polysaccharide Vaccine: What You Need to Know  Why get vaccinated? Pneumococcal polysaccharide vaccine (PPSV23) can prevent pneumococcal disease. Pneumococcal disease refers to any illness caused by pneumococcal bacteria. These bacteria can cause many types of illnesses, including pneumonia, which is an infection of the lungs. Pneumococcal bacteria are one of the most common causes of pneumonia. Besides pneumonia, pneumococcal bacteria can also cause:  · Ear infections,  · Sinus infections  · Meningitis (infection of the tissue covering the brain and spinal cord)  · Bacteremia (bloodstream infection)  Anyone can get pneumococcal disease, but children under 3years of age, people with certain medical conditions, adults 72 years or older, and cigarette smokers are at the highest risk. Most pneumococcal infections are mild. However, some can result in long-term problems, such as brain damage or hearing loss. Meningitis, bacteremia, and pneumonia caused by pneumococcal disease can be fatal.  PPSV23  PPSV23 protects against 23 types of bacteria that cause pneumococcal disease. PPSV23 is recommended for:  · All adults 72 years or older,  · Anyone 2 years or older with certain medical conditions that can lead to an increased risk for pneumococcal disease. Most people need only one dose of PPSV23. A second dose of PPSV23, and another type of pneumococcal vaccine called PCV13, are recommended for certain high-risk groups. Your health care provider can give you more information. People 65 years or older should get a dose of PPSV23 even if they have already gotten one or more doses of the vaccine before they turned 65. Talk with your health care provider  Tell your vaccine provider if the person getting the vaccine:  · Has had an allergic reaction after a previous dose of PPSV23, or has any severe, life-threatening allergies.   In some cases, your health care provider may decide to postpone PPSV23 vaccination to a future visit. People with minor illnesses, such as a cold, may be vaccinated. People who are moderately or severely ill should usually wait until they recover before getting PPSV23. Your health care provider can give you more information. Risks of a vaccine reaction  · Redness or pain where the shot is given, feeling tired, fever, or muscle aches can happen after PPSV23. People sometimes faint after medical procedures, including vaccination. Tell your provider if you feel dizzy or have vision changes or ringing in the ears. As with any medicine, there is a very remote chance of a vaccine causing a severe allergic reaction, other serious injury, or death. What if there is a serious problem? An allergic reaction could occur after the vaccinated person leaves the clinic. If you see signs of a severe allergic reaction (hives, swelling of the face and throat, difficulty breathing, a fast heartbeat, dizziness, or weakness), call 9-1-1 and get the person to the nearest hospital.  For other signs that concern you, call your health care provider. Adverse reactions should be reported to the Vaccine Adverse Event Reporting System (VAERS). Your health care provider will usually file this report, or you can do it yourself. Visit the VAERS website at www.vaers. hhs.gov at www.vaers. hhs.gov or call 9-243.177.2054. VAERS is only for reporting reactions, and VAERS staff do not give medical advice. How can I learn more? · Ask your health care provider. · Call your local or state health department. · Contact the Centers for Disease Control and Prevention (CDC):  ? Call 6-254.895.8777 (1-800-CDC-INFO) or  ? Visit CDC's website at www.cdc.gov/vaccines  Vaccine Information Statement  PPSV23 Vaccine  10/30/2019  [facility] and [facility] for Disease Control and Prevention  Many Vaccine Information Statements are available in [place] and other languages.  See www.immunize.org/vis. Hojas de información Sobre Vacunas están disponibles en español y en muchos otros idiomas. Visite Sylvia.si. Care instructions adapted under license by [employer] (which disclaims liability or warranty for this information). If you have questions about a medical condition or this instruction, always ask your healthcare professional. [address] any warranty or liability for your use of this information.

## 2022-02-18 RX ORDER — FENOFIBRATE 145 MG/1
TABLET, COATED ORAL
Qty: 90 TABLET | Refills: 1 | Status: SHIPPED | OUTPATIENT
Start: 2022-02-18 | End: 2022-08-23 | Stop reason: SDUPTHER

## 2022-08-23 RX ORDER — LOSARTAN POTASSIUM AND HYDROCHLOROTHIAZIDE 25; 100 MG/1; MG/1
TABLET ORAL
Qty: 90 TABLET | Refills: 1 | Status: SHIPPED | OUTPATIENT
Start: 2022-08-23

## 2022-08-23 RX ORDER — LOSARTAN POTASSIUM AND HYDROCHLOROTHIAZIDE 25; 100 MG/1; MG/1
1 TABLET ORAL DAILY
Qty: 90 TABLET | Refills: 1 | Status: CANCELLED | OUTPATIENT
Start: 2022-08-23

## 2022-08-23 NOTE — TELEPHONE ENCOUNTER
Hyzaar is already pending in another encounter    Last Visit: 2/14/22 with MD Audelia Amaya  Next Appointment: 8/31/22 with MD Audelia Amaya  Previous Refill Encounter(s): 2/18/22 #90 with 1 refill    Requested Prescriptions     Pending Prescriptions Disp Refills    fenofibrate nanocrystallized (TRICOR) 145 mg tablet 90 Tablet 1     Sig: take 1 tablet by mouth once daily         For Pharmacy [facility] in place:   Recommendation Provided To:    Intervention Detail: Discontinued Rx: 1, reason: Duplicate Therapy and New Rx: 1, reason: Patient Preference  Gap Closed?:   Intervention Accepted By:   Time Spent (min): 5

## 2022-08-26 RX ORDER — FENOFIBRATE 145 MG/1
TABLET, COATED ORAL
Qty: 90 TABLET | Refills: 1 | Status: SHIPPED | OUTPATIENT
Start: 2022-08-26

## 2022-08-31 ENCOUNTER — OFFICE VISIT (OUTPATIENT)
Dept: FAMILY MEDICINE CLINIC | Age: 67
End: 2022-08-31
Payer: COMMERCIAL

## 2022-08-31 VITALS
HEART RATE: 67 BPM | TEMPERATURE: 97.4 F | WEIGHT: 190 LBS | BODY MASS INDEX: 29.76 KG/M2 | DIASTOLIC BLOOD PRESSURE: 76 MMHG | OXYGEN SATURATION: 98 % | SYSTOLIC BLOOD PRESSURE: 132 MMHG

## 2022-08-31 DIAGNOSIS — I10 HTN (HYPERTENSION), BENIGN: Primary | ICD-10-CM

## 2022-08-31 DIAGNOSIS — E78.1 HYPERTRIGLYCERIDEMIA: ICD-10-CM

## 2022-08-31 DIAGNOSIS — Z23 ENCOUNTER FOR IMMUNIZATION: ICD-10-CM

## 2022-08-31 DIAGNOSIS — R73.09 ELEVATED HEMOGLOBIN A1C: ICD-10-CM

## 2022-08-31 PROCEDURE — 1124F ACP DISCUSS-NO DSCNMKR DOCD: CPT | Performed by: FAMILY MEDICINE

## 2022-08-31 PROCEDURE — 99213 OFFICE O/P EST LOW 20 MIN: CPT | Performed by: FAMILY MEDICINE

## 2022-08-31 PROCEDURE — 90750 HZV VACC RECOMBINANT IM: CPT | Performed by: FAMILY MEDICINE

## 2022-08-31 NOTE — PROGRESS NOTES
HPI:  Marianna Lawrence is a 74 y.o. male who presents today with   Chief Complaint   Patient presents with    Hypertension    Cholesterol Problem      Patient is here to follow-up on high blood pressure and hypercholesterolemia. Patient has also had some elevated blood sugars to that require some follow-up. He has been feeling well today. He has no new complaints today. No refills are needed at this current time. He is prepared to get blood work. Patient also would like to get his shingles vaccination today. 3 most recent PHQ Screens 8/31/2022   PHQ Not Done -   Little interest or pleasure in doing things Not at all   Feeling down, depressed, irritable, or hopeless Not at all   Total Score PHQ 2 0   Trouble falling or staying asleep, or sleeping too much -   Feeling tired or having little energy -   Poor appetite, weight loss, or overeating -   Feeling bad about yourself - or that you are a failure or have let yourself or your family down -   Trouble concentrating on things such as school, work, reading, or watching TV -   Moving or speaking so slowly that other people could have noticed; or the opposite being so fidgety that others notice -   Thoughts of being better off dead, or hurting yourself in some way -   How difficult have these problems made it for you to do your work, take care of your home and get along with others -               PMH,  Meds, Allergies, Family History, Social history reviewed      Current Outpatient Medications   Medication Sig Dispense Refill    fenofibrate nanocrystallized (TRICOR) 145 mg tablet take 1 tablet by mouth once daily 90 Tablet 1    losartan-hydroCHLOROthiazide (HYZAAR) 100-25 mg per tablet take 1 tablet by mouth once daily 90 Tablet 1    timolol (TIMOPTIC) 0.5 % ophthalmic solution PLACE 1 DROP IN EACH EYE EVERY MORNING      aspirin delayed-release 81 mg tablet Take 81 mg by mouth daily.           No Known Allergies               ROS as per HPI      Visit Vitals  /76   Pulse 67   Temp 97.4 °F (36.3 °C) (Temporal)   Wt 190 lb (86.2 kg)   SpO2 98%   BMI 29.76 kg/m²     Physical Exam  General appearance: alert, cooperative, no distress, appears stated age  Neck: supple, symmetrical, trachea midline, no adenopathy, thyroid: not enlarged, symmetric, no tenderness/mass/nodules, no carotid bruit and no JVD  Lungs: clear to auscultation bilaterally  Heart: regular rate and rhythm, S1, S2 normal, no murmur, click, rub or gallop      Extremities: extremities normal, atraumatic, no cyanosis or edema      Assessment/Plan:    Diagnoses and all orders for this visit:    1. HTN (hypertension), benign    2. Hypertriglyceridemia  -     LIPID PANEL; Future  -     METABOLIC PANEL, COMPREHENSIVE; Future    3. Elevated hemoglobin A1c  -     HEMOGLOBIN A1C WITH EAG; Future    4. Encounter for immunization  -     ZOSTER, [address] [place] [address], (18 YRS +), IM    Other orders  -     HEMOGLOBIN A1C W/O EAG      As above  Patient stable  Labs as ordered  Shingles vaccination as ordered  An After Visit Summary was printed and given to the patient. This has been fully explained to the patient, who indicates understanding.       Follow-up and Dispositions    Return in about 6 months (around 2/28/2023) for well exam.            Ene Padron MD

## 2022-08-31 NOTE — PROGRESS NOTES
1. \"Have you been to the ER, urgent care clinic since your last visit? Hospitalized since your last visit? \" No    2. \"Have you seen or consulted any other health care providers outside of the [address] [place] [address] since your last visit? \" No     3. For patients aged 39-70: Has the patient had a colonoscopy / FIT/ Cologuard? Yes - no Care Gap present      If the patient is female:    4. For patients aged 41-77: Has the patient had a mammogram within the past 2 years? NA - based on age or sex      11. For patients aged 21-65: Has the patient had a pap smear?  NA - based on age or sex

## 2022-08-31 NOTE — PATIENT INSTRUCTIONS
DASH Diet: Care Instructions  Your Care Instructions     The DASH diet is an eating plan that can help lower your blood pressure. DASH stands for Dietary Approaches to Stop Hypertension. Hypertension is high blood pressure. The DASH diet focuses on eating foods that are high in calcium, potassium, and magnesium. These nutrients can lower blood pressure. The foods that are highest in these nutrients are fruits, vegetables, low-fat dairy products, nuts, seeds, and legumes. But taking calcium, potassium, and magnesium supplements instead of eating foods that are high in those nutrients does not have the same effect. The DASH diet also includes whole grains, fish, and poultry. The DASH diet is one of several lifestyle changes your doctor may recommend to lower your high blood pressure. Your doctor may also want you to decrease the amount of sodium in your diet. Lowering sodium while following the DASH diet can lower blood pressure even further than just the DASH diet alone. Follow-up care is a key part of your treatment and safety. Be sure to make and go to all appointments, and call your doctor if you are having problems. It's also a good idea to know your test results and keep a list of the medicines you take. How can you care for yourself at home? Following the DASH diet  Eat 4 to 5 servings of fruit each day. A serving is 1 medium-sized piece of fruit, ½ cup chopped or canned fruit, 1/4 cup dried fruit, or 4 ounces (½ cup) of fruit juice. Choose fruit more often than fruit juice. Eat 4 to 5 servings of vegetables each day. A serving is 1 cup of lettuce or raw leafy vegetables, ½ cup of chopped or cooked vegetables, or 4 ounces (½ cup) of vegetable juice. Choose vegetables more often than vegetable juice. Get 2 to 3 servings of low-fat and fat-free dairy each day. A serving is 8 ounces of milk, 1 cup of yogurt, or 1 ½ ounces of cheese. Eat 6 to 8 servings of grains each day.  A serving is 1 slice of bread, 1 ounce of dry cereal, or ½ cup of cooked rice, pasta, or cooked cereal. Try to choose whole-grain products as much as possible. Limit lean meat, poultry, and fish to 2 servings each day. A serving is 3 ounces, about the size of a deck of cards. Eat 4 to 5 servings of nuts, seeds, and legumes (cooked dried beans, lentils, and split peas) each week. A serving is 1/3 cup of nuts, 2 tablespoons of seeds, or ½ cup of cooked beans or peas. Limit fats and oils to 2 to 3 servings each day. A serving is 1 teaspoon of vegetable oil or 2 tablespoons of salad dressing. Limit sweets and added sugars to 5 servings or less a week. A serving is 1 tablespoon jelly or jam, ½ cup sorbet, or 1 cup of lemonade. Eat less than 2,300 milligrams (mg) of sodium a day. If you limit your sodium to 1,500 mg a day, you can lower your blood pressure even more. Be aware that all of these are the suggested number of servings for people who eat 1,800 to 2,000 calories a day. Your recommended number of servings may be different if you need more or fewer calories. Tips for success  Start small. Do not try to make dramatic changes to your diet all at once. You might feel that you are missing out on your favorite foods and then be more likely to not follow the plan. Make small changes, and stick with them. Once those changes become habit, add a few more changes. Try some of the following:  Make it a goal to eat a fruit or vegetable at every meal and at snacks. This will make it easy to get the recommended amount of fruits and vegetables each day. Try yogurt topped with fruit and nuts for a snack or healthy dessert. Add lettuce, tomato, cucumber, and onion to sandwiches. Combine a ready-made pizza crust with low-fat mozzarella cheese and lots of vegetable toppings. Try using tomatoes, squash, spinach, broccoli, carrots, cauliflower, and onions.   Have a variety of cut-up vegetables with a low-fat dip as an appetizer instead of chips and dip.  Sprinkle sunflower seeds or chopped almonds over salads. Or try adding chopped walnuts or almonds to cooked vegetables. Try some vegetarian meals using beans and peas. Add garbanzo or kidney beans to salads. Make burritos and tacos with mashed medina beans or black beans. Where can you learn more? Go to http://www.orellana.com/  Enter H967 in the search box to learn more about \"DASH Diet: Care Instructions. \"  Current as of: January 10, 2022               Content Version: 13.2  © 6037-2212 [employer]. Care instructions adapted under license by [employer] (which disclaims liability or warranty for this information). If you have questions about a medical condition or this instruction, always ask your healthcare professional. [address] any warranty or liability for your use of this information.

## 2022-09-01 LAB
A-G RATIO,AGRAT: 2.1 RATIO (ref 1.1–2.6)
ALBUMIN SERPL-MCNC: 4.9 G/DL (ref 3.5–5)
ALP SERPL-CCNC: 42 U/L (ref 40–125)
ALT SERPL-CCNC: 21 U/L (ref 5–40)
ANION GAP SERPL CALC-SCNC: 11 MMOL/L (ref 3–15)
AST SERPL W P-5'-P-CCNC: 28 U/L (ref 10–37)
AVG GLU, 10930: 119 MG/DL (ref 91–123)
BILIRUB SERPL-MCNC: 0.5 MG/DL (ref 0.2–1.2)
BUN SERPL-MCNC: 26 MG/DL (ref 6–22)
CALCIUM SERPL-MCNC: 10.7 MG/DL (ref 8.4–10.5)
CHLORIDE SERPL-SCNC: 99 MMOL/L (ref 98–110)
CHOLEST SERPL-MCNC: 223 MG/DL (ref 110–200)
CO2 SERPL-SCNC: 28 MMOL/L (ref 20–32)
CREAT SERPL-MCNC: 1.4 MG/DL (ref 0.8–1.6)
GLOBULIN,GLOB: 2.3 G/DL (ref 2–4)
GLOMERULAR FILTRATION RATE: 54.4 ML/MIN/1.73 SQ.M.
GLUCOSE SERPL-MCNC: 107 MG/DL (ref 70–99)
HBA1C MFR BLD HPLC: 5.8 % (ref 4.8–5.6)
HDLC SERPL-MCNC: 4.1 MG/DL (ref 0–5)
HDLC SERPL-MCNC: 54 MG/DL
LDL/HDL RATIO,LDHD: 2.6
LDLC SERPL CALC-MCNC: 142 MG/DL (ref 50–99)
NON-HDL CHOLESTEROL, 011976: 169 MG/DL
POTASSIUM SERPL-SCNC: 5.4 MMOL/L (ref 3.5–5.5)
PROT SERPL-MCNC: 7.2 G/DL (ref 6.2–8.1)
SODIUM SERPL-SCNC: 138 MMOL/L (ref 133–145)
TRIGL SERPL-MCNC: 136 MG/DL (ref 40–149)
VLDLC SERPL CALC-MCNC: 27 MG/DL (ref 8–30)

## 2023-02-19 RX ORDER — LOSARTAN POTASSIUM AND HYDROCHLOROTHIAZIDE 25; 100 MG/1; MG/1
TABLET ORAL
Qty: 90 TABLET | Refills: 2 | Status: SHIPPED | OUTPATIENT
Start: 2023-02-19

## 2023-03-01 ENCOUNTER — OFFICE VISIT (OUTPATIENT)
Age: 68
End: 2023-03-01

## 2023-03-01 VITALS
TEMPERATURE: 97.9 F | OXYGEN SATURATION: 99 % | HEART RATE: 81 BPM | BODY MASS INDEX: 29.88 KG/M2 | HEIGHT: 67 IN | SYSTOLIC BLOOD PRESSURE: 146 MMHG | WEIGHT: 190.4 LBS | RESPIRATION RATE: 16 BRPM | DIASTOLIC BLOOD PRESSURE: 80 MMHG

## 2023-03-01 DIAGNOSIS — E78.1 PURE HYPERGLYCERIDEMIA: ICD-10-CM

## 2023-03-01 DIAGNOSIS — R73.09 OTHER ABNORMAL GLUCOSE: ICD-10-CM

## 2023-03-01 DIAGNOSIS — Z00.00 ENCOUNTER FOR WELL ADULT EXAM WITHOUT ABNORMAL FINDINGS: Primary | ICD-10-CM

## 2023-03-01 DIAGNOSIS — I10 ESSENTIAL (PRIMARY) HYPERTENSION: ICD-10-CM

## 2023-03-01 SDOH — ECONOMIC STABILITY: FOOD INSECURITY: WITHIN THE PAST 12 MONTHS, YOU WORRIED THAT YOUR FOOD WOULD RUN OUT BEFORE YOU GOT MONEY TO BUY MORE.: NEVER TRUE

## 2023-03-01 SDOH — ECONOMIC STABILITY: HOUSING INSECURITY
IN THE LAST 12 MONTHS, WAS THERE A TIME WHEN YOU DID NOT HAVE A STEADY PLACE TO SLEEP OR SLEPT IN A SHELTER (INCLUDING NOW)?: NO

## 2023-03-01 SDOH — ECONOMIC STABILITY: FOOD INSECURITY: WITHIN THE PAST 12 MONTHS, THE FOOD YOU BOUGHT JUST DIDN'T LAST AND YOU DIDN'T HAVE MONEY TO GET MORE.: NEVER TRUE

## 2023-03-01 SDOH — ECONOMIC STABILITY: INCOME INSECURITY: HOW HARD IS IT FOR YOU TO PAY FOR THE VERY BASICS LIKE FOOD, HOUSING, MEDICAL CARE, AND HEATING?: NOT HARD AT ALL

## 2023-03-01 ASSESSMENT — PATIENT HEALTH QUESTIONNAIRE - PHQ9
SUM OF ALL RESPONSES TO PHQ QUESTIONS 1-9: 0
SUM OF ALL RESPONSES TO PHQ QUESTIONS 1-9: 0
2. FEELING DOWN, DEPRESSED OR HOPELESS: 0
SUM OF ALL RESPONSES TO PHQ9 QUESTIONS 1 & 2: 0
SUM OF ALL RESPONSES TO PHQ QUESTIONS 1-9: 0
1. LITTLE INTEREST OR PLEASURE IN DOING THINGS: 0
SUM OF ALL RESPONSES TO PHQ QUESTIONS 1-9: 0

## 2023-03-01 NOTE — PROGRESS NOTES
Labs 1. \"Have you been to the ER, urgent care clinic since your last visit? Hospitalized since your last visit? \" No    2. \"Have you seen or consulted any other health care providers outside of the [address] [place] [address] since your last visit? \" No     3. For patients aged 39-70: Has the patient had a colonoscopy / FIT/ Cologuard? Yes - Care Gap present. Most recent result on file      Well Adult Note  Name: Early Marjorie Date: 3/12/2023   MRN: [id number] Sex: Male   Age: 79 y.o. Ethnicity: Declined   : 1955 Race: Black / African American      Harper is here for well adult exam.  History:  He has no new complaints. He is fasting for blood work today. Pt feeling well;       Review of Systems  As per HPI    No Known Allergies      Prior to Visit Medications    Medication Sig Taking?  Authorizing Provider   losartan-hydroCHLOROthiazide (HYZAAR) 100-25 MG per tablet take 1 tablet by mouth once daily Yes Kary Torres MD   aspirin 81 MG EC tablet Take 81 mg by mouth daily Yes Ar Automatic Reconciliation   fenofibrate (TRICOR) 145 MG tablet take 1 tablet by mouth once daily Yes Ar Automatic Reconciliation   timolol (TIMOPTIC) 0.5 % ophthalmic solution PLACE 1 DROP IN [facility] EYE EVERY MORNING Yes Ar Automatic Reconciliation         Past Medical History:   Diagnosis Date    Allergic rhinitis 2010    Allergic rhinitis     environmental    Arthritis     neck, shoulder/ chiropractor    Essential hypertension, benign     Glaucoma 2010    Glaucoma 2010    HTN (hypertension), benign 2010    Hypertriglyceridemia        Past Surgical History:   Procedure Laterality Date    COLONOSCOPY      due 2012    [address]    left groin         Family History   Problem Relation Age of Onset    Cancer Mother         bone ca    Diabetes Father     Diabetes Mother        Social History     Tobacco Use    Smoking status: Never    Smokeless tobacco: Never   Vaping Use    Vaping Use: Never used   Substance Use Topics    Alcohol use: Yes     Comment: occassional beer    Drug use: No       Objective   BP (!) 146/80   Pulse 81   Temp 97.9 °F (36.6 °C) (Temporal)   Resp 16   Ht 5' 7\" (1.702 m)   Wt 190 lb 6.4 oz (86.4 kg)   SpO2 99%   BMI 29.82 kg/m²   Wt Readings from Last 3 Encounters:   03/01/23 190 lb 6.4 oz (86.4 kg)   08/31/22 190 lb (86.2 kg)   06/17/22 193 lb (87.5 kg)     There were no vitals filed for this visit. Physical Exam    BP (!) 146/80   Pulse 81   Temp 97.9 °F (36.6 °C) (Temporal)   Resp 16   Ht 5' 7\" (1.702 m)   Wt 190 lb 6.4 oz (86.4 kg)   SpO2 99%   BMI 29.82 kg/m²   General appearance: alert, cooperative, no distress, appears stated age  Neck: supple, symmetrical, trachea midline, no adenopathy, thyroid: not enlarged, symmetric, no tenderness/mass/nodules, no carotid bruit and no JVD , TMs are clear  Lungs: clear to auscultation bilaterally  Heart: regular rate and rhythm, S1, S2 normal, no murmur, click, rub or gallop  Abdomen: soft, non-tender. Bowel sounds normal. No masses,  no organomegaly  Extremities: extremities normal, atraumatic, no cyanosis or edema          Assessment   Plan   1. Encounter for well adult exam without abnormal findings  2. Essential (primary) hypertension  -     CBC with Auto Differential; Future  -     Comprehensive Metabolic Panel; Future  3. Pure hyperglyceridemia  -     Lipid Panel; Future  4. Other abnormal glucose  -     Hemoglobin A1C; Future       As above  Patient stable  Labs as ordered  Return in 6 months (on 9/1/2023) for BP, htn, cholesterol. This has been fully explained to the patient, who indicates understanding. AVS is accessible thru mychart and pt has been advised of same.        Personalized Preventive Plan   Current Health Maintenance Status  Immunization History   Administered Date(s) Administered    COVID-19, MODERNA BLUE border, Primary or Immunocompromised, (age 12y+), IM, 100 mcg/0.5mL 03/16/2021, 04/12/2021    Influenza Virus Vaccine 10/26/2011, 10/01/2014, 09/02/2015, 12/08/2016, 10/24/2017, 12/19/2018, 09/01/2020, 09/29/2021    Influenza, FLUARIX, FLULAVAL, Allena Dach (age 10 mo+) AND AFLURIA, (age 1 y+), PF, 0.5mL 10/01/2014, 09/02/2015, 12/08/2016, 10/24/2017, 12/19/2018, 09/01/2020    Influenza, Trivalent PF 09/30/2013    Pneumococcal Polysaccharide ([id number]) 02/14/2022    Tdap (Boostrix, Adacel) 07/05/2016    Zoster Recombinant (Shingrix) 02/14/2022, 08/31/2022        Health Maintenance   Topic Date Due    Hepatitis C screen  Never done    COVID-19 Vaccine (3 - Booster for Moderna series) 06/07/2021    Flu vaccine (1) 08/01/2022    A1C test (Diabetic or Prediabetic)  03/01/2024    Depression Screen  03/01/2024    GFR test (Diabetes, CKD 3-4, OR last GFR 15-59)  03/01/2024    DTaP/Tdap/Td vaccine (2 - Td or Tdap) 07/05/2026    Lipids  03/01/2028    Colorectal Cancer Screen  11/05/2029    Shingles vaccine  Completed    Pneumococcal 65+ years Vaccine  Completed    Hepatitis A vaccine  Aged Out    Hib vaccine  Aged Out    Meningococcal (ACWY) vaccine  Aged Out     Recommendations for Hover 3D Due: see orders and patient instructions/AVS.    Return in 6 months (on 9/1/2023) for BP, htn, cholesterol.

## 2023-03-02 LAB
A/G RATIO: 1.9 RATIO (ref 1.1–2.6)
ALBUMIN SERPL-MCNC: 4.8 G/DL (ref 3.5–5)
ALP BLD-CCNC: 46 U/L (ref 40–125)
ALT SERPL-CCNC: 19 U/L (ref 5–40)
ANION GAP SERPL CALCULATED.3IONS-SCNC: 11 MMOL/L (ref 3–15)
AST SERPL-CCNC: 28 U/L (ref 10–37)
AVERAGE GLUCOSE: 117 MG/DL (ref 91–123)
BASOPHILS # BLD: 1 % (ref 0–2)
BASOPHILS ABSOLUTE: 0 K/UL (ref 0–0.2)
BILIRUB SERPL-MCNC: 0.5 MG/DL (ref 0.2–1.2)
BUN BLDV-MCNC: 27 MG/DL (ref 6–22)
CALCIUM SERPL-MCNC: 10.2 MG/DL (ref 8.4–10.5)
CHLORIDE BLD-SCNC: 99 MMOL/L (ref 98–110)
CHOLESTEROL/HDL RATIO: 3.7 (ref 0–5)
CHOLESTEROL: 223 MG/DL (ref 110–200)
CO2: 29 MMOL/L (ref 20–32)
CREAT SERPL-MCNC: 1.4 MG/DL (ref 0.8–1.6)
EOSINOPHIL # BLD: 3 % (ref 0–6)
EOSINOPHILS ABSOLUTE: 0.2 K/UL (ref 0–0.5)
GLOBULIN: 2.5 G/DL (ref 2–4)
GLOMERULAR FILTRATION RATE: 54 ML/MIN/1.73 SQ.M.
GLUCOSE: 110 MG/DL (ref 70–99)
HBA1C MFR BLD: 5.7 % (ref 4.8–5.6)
HCT VFR BLD CALC: 43.6 % (ref 37.8–52.2)
HDLC SERPL-MCNC: 61 MG/DL
HEMOGLOBIN: 14.4 G/DL (ref 12.6–17.1)
LDL CHOLESTEROL CALCULATED: 141 MG/DL (ref 50–99)
LDL/HDL RATIO: 2.3
LYMPHOCYTES # BLD: 22 % (ref 20–45)
LYMPHOCYTES ABSOLUTE: 1.4 K/UL (ref 1–4.8)
MCH RBC QN AUTO: 31 PG (ref 26–34)
MCHC RBC AUTO-ENTMCNC: 33 G/DL (ref 31–36)
MCV RBC AUTO: 93 FL (ref 80–95)
MONOCYTES ABSOLUTE: 0.6 K/UL (ref 0.1–1)
MONOCYTES: 9 % (ref 3–12)
NEUTROPHILS ABSOLUTE: 4.1 K/UL (ref 1.8–7.7)
NEUTROPHILS: 65 % (ref 40–75)
NON-HDL CHOLESTEROL: 162 MG/DL
PDW BLD-RTO: 12.9 % (ref 10–15.5)
PLATELET # BLD: 278 K/UL (ref 140–440)
PMV BLD AUTO: 10.3 FL (ref 9–13)
POTASSIUM SERPL-SCNC: 5.2 MMOL/L (ref 3.5–5.5)
RBC: 4.69 M/UL (ref 3.8–5.8)
SODIUM BLD-SCNC: 139 MMOL/L (ref 133–145)
TOTAL PROTEIN: 7.3 G/DL (ref 6.2–8.1)
TRIGL SERPL-MCNC: 107 MG/DL (ref 40–149)
VLDLC SERPL CALC-MCNC: 21 MG/DL (ref 8–30)
WBC: 6.3 K/UL (ref 4–11)

## 2023-08-18 NOTE — TELEPHONE ENCOUNTER
Last Visit: 03- OV   Next Appointment: 09-  Previous Refill Encounter: 08- #90 tabs with 1 refills      Requested Prescriptions     Pending Prescriptions Disp Refills    fenofibrate (TRICOR) 145 MG tablet [Pharmacy Med Name: FENOFIBRATE 145 MG TABLET] 90 tablet 1     Sig: take 1 tablet by mouth once daily

## 2023-08-23 RX ORDER — FENOFIBRATE 145 MG/1
TABLET, COATED ORAL
Qty: 90 TABLET | Refills: 1 | Status: SHIPPED | OUTPATIENT
Start: 2023-08-23

## 2023-10-12 ENCOUNTER — OFFICE VISIT (OUTPATIENT)
Age: 68
End: 2023-10-12
Payer: COMMERCIAL

## 2023-10-12 VITALS
HEIGHT: 68 IN | BODY MASS INDEX: 28.95 KG/M2 | DIASTOLIC BLOOD PRESSURE: 80 MMHG | HEART RATE: 72 BPM | SYSTOLIC BLOOD PRESSURE: 124 MMHG | OXYGEN SATURATION: 98 % | TEMPERATURE: 97.7 F | RESPIRATION RATE: 16 BRPM | WEIGHT: 191 LBS

## 2023-10-12 DIAGNOSIS — I10 ESSENTIAL (PRIMARY) HYPERTENSION: Primary | ICD-10-CM

## 2023-10-12 DIAGNOSIS — R73.09 OTHER ABNORMAL GLUCOSE: ICD-10-CM

## 2023-10-12 DIAGNOSIS — Z11.59 NEED FOR HEPATITIS C SCREENING TEST: ICD-10-CM

## 2023-10-12 PROCEDURE — 3074F SYST BP LT 130 MM HG: CPT | Performed by: FAMILY MEDICINE

## 2023-10-12 PROCEDURE — 3078F DIAST BP <80 MM HG: CPT | Performed by: FAMILY MEDICINE

## 2023-10-12 PROCEDURE — 1123F ACP DISCUSS/DSCN MKR DOCD: CPT | Performed by: FAMILY MEDICINE

## 2023-10-12 PROCEDURE — 99214 OFFICE O/P EST MOD 30 MIN: CPT | Performed by: FAMILY MEDICINE

## 2023-10-12 NOTE — PROGRESS NOTES
1. \"Have you been to the ER, urgent care clinic since your last visit? Hospitalized since your last visit? \" No    2. \"Have you seen or consulted any other health care providers outside of the [address] [place] [address] since your last visit? \" No     3. For patients aged 43-73: Has the patient had a colonoscopy / FIT/ Cologuard? Yes - Care Gap present.  Most recent result on file

## 2023-10-14 LAB
ALBUMIN SERPL-MCNC: 4.4 G/DL (ref 3.9–4.9)
ALBUMIN/GLOB SERPL: 1.8 {RATIO} (ref 1.2–2.2)
ALP SERPL-CCNC: 47 IU/L (ref 44–121)
ALT SERPL-CCNC: 35 IU/L (ref 0–44)
AST SERPL-CCNC: 48 IU/L (ref 0–40)
BILIRUB SERPL-MCNC: 0.5 MG/DL (ref 0–1.2)
BUN SERPL-MCNC: 21 MG/DL (ref 8–27)
BUN/CREAT SERPL: 16 (ref 10–24)
CALCIUM SERPL-MCNC: 10.2 MG/DL (ref 8.6–10.2)
CHLORIDE SERPL-SCNC: 97 MMOL/L (ref 96–106)
CHOLEST SERPL-MCNC: 198 MG/DL (ref 100–199)
CO2 SERPL-SCNC: 26 MMOL/L (ref 20–29)
CREAT SERPL-MCNC: 1.33 MG/DL (ref 0.76–1.27)
EGFRCR SERPLBLD CKD-EPI 2021: 59 ML/MIN/1.73
GLOBULIN SER CALC-MCNC: 2.4 G/DL (ref 1.5–4.5)
GLUCOSE SERPL-MCNC: 113 MG/DL (ref 70–99)
HBA1C MFR BLD: 5.9 % (ref 4.8–5.6)
HCV IGG SERPL QL IA: NON REACTIVE
HDLC SERPL-MCNC: 54 MG/DL
LDLC SERPL CALC-MCNC: 121 MG/DL (ref 0–99)
POTASSIUM SERPL-SCNC: 5.5 MMOL/L (ref 3.5–5.2)
PROT SERPL-MCNC: 6.8 G/DL (ref 6–8.5)
SODIUM SERPL-SCNC: 136 MMOL/L (ref 134–144)
SPECIMEN STATUS REPORT: NORMAL
TRIGL SERPL-MCNC: 129 MG/DL (ref 0–149)
TSH SERPL DL<=0.005 MIU/L-ACNC: 1.63 UIU/ML (ref 0.45–4.5)
VLDLC SERPL CALC-MCNC: 23 MG/DL (ref 5–40)

## 2023-11-17 NOTE — TELEPHONE ENCOUNTER
Last Visit: 10- OV   Next Appointment: 03-  Previous Refill Encounter: 02- #90 tabs with 2 refills      Requested Prescriptions     Pending Prescriptions Disp Refills    losartan-hydroCHLOROthiazide (HYZAAR) 100-25 MG per tablet [Pharmacy Med Name: LOSARTAN-HCTZ 100-25 MG TAB] 90 tablet 2     Sig: take 1 tablet by mouth once daily

## 2023-11-18 RX ORDER — LOSARTAN POTASSIUM AND HYDROCHLOROTHIAZIDE 25; 100 MG/1; MG/1
TABLET ORAL
Qty: 90 TABLET | Refills: 3 | Status: SHIPPED | OUTPATIENT
Start: 2023-11-18

## 2023-11-20 RX ORDER — FENOFIBRATE 145 MG/1
145 TABLET, COATED ORAL DAILY
Qty: 90 TABLET | Refills: 1 | Status: CANCELLED | OUTPATIENT
Start: 2023-11-20

## 2023-11-20 RX ORDER — LOSARTAN POTASSIUM AND HYDROCHLOROTHIAZIDE 25; 100 MG/1; MG/1
1 TABLET ORAL DAILY
Qty: 90 TABLET | Refills: 3 | Status: CANCELLED | OUTPATIENT
Start: 2023-11-20

## 2024-02-16 NOTE — TELEPHONE ENCOUNTER
Last Visit: 10/12/2023   Next Appointment: 05/15/2024    Requested Prescriptions     Pending Prescriptions Disp Refills    fenofibrate (TRICOR) 145 MG tablet [Pharmacy Med Name: FENOFIBRATE 145 MG TABLET] 90 tablet 1     Sig: take 1 tablet by mouth once daily

## 2024-02-21 RX ORDER — FENOFIBRATE 145 MG/1
TABLET, COATED ORAL
Qty: 90 TABLET | Refills: 1 | Status: SHIPPED | OUTPATIENT
Start: 2024-02-21 | End: 2024-02-22 | Stop reason: SDUPTHER

## 2024-02-22 RX ORDER — FENOFIBRATE 145 MG/1
145 TABLET, COATED ORAL DAILY
Qty: 90 TABLET | Refills: 1 | Status: SHIPPED | OUTPATIENT
Start: 2024-02-22

## 2024-02-22 NOTE — TELEPHONE ENCOUNTER
Duplicate Medication request. Please see message from 02/15/2024. Medication was filled 02/21/2024, thank you.

## 2024-08-14 NOTE — TELEPHONE ENCOUNTER
Last Visit: 05/15/2024   Next Appointment: 11/18/2024    Requested Prescriptions     Pending Prescriptions Disp Refills    losartan-hydroCHLOROthiazide (HYZAAR) 100-25 MG per tablet 90 tablet 3     Sig: Take 1 tablet by mouth daily    fenofibrate (TRICOR) 145 MG tablet 90 tablet 1     Sig: Take 1 tablet by mouth daily

## 2024-08-16 RX ORDER — FENOFIBRATE 145 MG/1
145 TABLET, COATED ORAL DAILY
Qty: 90 TABLET | Refills: 3 | Status: SHIPPED | OUTPATIENT
Start: 2024-08-16

## 2024-08-16 RX ORDER — LOSARTAN POTASSIUM AND HYDROCHLOROTHIAZIDE 25; 100 MG/1; MG/1
1 TABLET ORAL DAILY
Qty: 90 TABLET | Refills: 3 | Status: SHIPPED | OUTPATIENT
Start: 2024-08-16

## 2024-11-12 RX ORDER — LOSARTAN POTASSIUM AND HYDROCHLOROTHIAZIDE 25; 100 MG/1; MG/1
1 TABLET ORAL DAILY
Qty: 90 TABLET | Refills: 3 | Status: CANCELLED | OUTPATIENT
Start: 2024-11-12

## 2024-11-18 ENCOUNTER — HOSPITAL ENCOUNTER (OUTPATIENT)
Facility: HOSPITAL | Age: 69
Setting detail: SPECIMEN
Discharge: HOME | End: 2024-11-21

## 2024-11-18 ENCOUNTER — OFFICE VISIT (OUTPATIENT)
Facility: CLINIC | Age: 69
End: 2024-11-18
Payer: COMMERCIAL

## 2024-11-18 VITALS
DIASTOLIC BLOOD PRESSURE: 84 MMHG | BODY MASS INDEX: 28.35 KG/M2 | WEIGHT: 191.4 LBS | HEART RATE: 80 BPM | SYSTOLIC BLOOD PRESSURE: 160 MMHG | HEIGHT: 69 IN | OXYGEN SATURATION: 97 % | RESPIRATION RATE: 16 BRPM | TEMPERATURE: 97.5 F

## 2024-11-18 DIAGNOSIS — R73.09 OTHER ABNORMAL GLUCOSE: ICD-10-CM

## 2024-11-18 DIAGNOSIS — E78.1 HYPERTRIGLYCERIDEMIA: ICD-10-CM

## 2024-11-18 DIAGNOSIS — I10 HTN (HYPERTENSION), BENIGN: Primary | ICD-10-CM

## 2024-11-18 LAB — SENTARA SPECIMEN COLLECTION: NORMAL

## 2024-11-18 PROCEDURE — 99001 SPECIMEN HANDLING PT-LAB: CPT

## 2024-11-18 PROCEDURE — 1123F ACP DISCUSS/DSCN MKR DOCD: CPT | Performed by: FAMILY MEDICINE

## 2024-11-18 PROCEDURE — 3077F SYST BP >= 140 MM HG: CPT | Performed by: FAMILY MEDICINE

## 2024-11-18 PROCEDURE — 99214 OFFICE O/P EST MOD 30 MIN: CPT | Performed by: FAMILY MEDICINE

## 2024-11-18 PROCEDURE — 3079F DIAST BP 80-89 MM HG: CPT | Performed by: FAMILY MEDICINE

## 2024-11-18 ASSESSMENT — ANXIETY QUESTIONNAIRES
2. NOT BEING ABLE TO STOP OR CONTROL WORRYING: NOT AT ALL
6. BECOMING EASILY ANNOYED OR IRRITABLE: NOT AT ALL
GAD7 TOTAL SCORE: 0
4. TROUBLE RELAXING: NOT AT ALL
1. FEELING NERVOUS, ANXIOUS, OR ON EDGE: NOT AT ALL
5. BEING SO RESTLESS THAT IT IS HARD TO SIT STILL: NOT AT ALL
IF YOU CHECKED OFF ANY PROBLEMS ON THIS QUESTIONNAIRE, HOW DIFFICULT HAVE THESE PROBLEMS MADE IT FOR YOU TO DO YOUR WORK, TAKE CARE OF THINGS AT HOME, OR GET ALONG WITH OTHER PEOPLE: NOT DIFFICULT AT ALL
3. WORRYING TOO MUCH ABOUT DIFFERENT THINGS: NOT AT ALL
7. FEELING AFRAID AS IF SOMETHING AWFUL MIGHT HAPPEN: NOT AT ALL

## 2024-11-18 ASSESSMENT — PATIENT HEALTH QUESTIONNAIRE - PHQ9
SUM OF ALL RESPONSES TO PHQ QUESTIONS 1-9: 0
1. LITTLE INTEREST OR PLEASURE IN DOING THINGS: NOT AT ALL
SUM OF ALL RESPONSES TO PHQ QUESTIONS 1-9: 0
2. FEELING DOWN, DEPRESSED OR HOPELESS: NOT AT ALL
SUM OF ALL RESPONSES TO PHQ QUESTIONS 1-9: 0
SUM OF ALL RESPONSES TO PHQ9 QUESTIONS 1 & 2: 0
SUM OF ALL RESPONSES TO PHQ QUESTIONS 1-9: 0

## 2024-11-18 NOTE — PROGRESS NOTES
\"Have you been to the ER, urgent care clinic since your last visit?  Hospitalized since your last visit?\"    NO    “Have you seen or consulted any other health care providers outside of [facility] since your last visit?”    This include any pap smears or colon screening. Yes, Gastroenterology- Dr. Garcia    Click Here for Release of Records Request

## 2024-11-18 NOTE — PATIENT INSTRUCTIONS
dip.  Sprinkle sunflower seeds or chopped almonds over salads. Or try adding chopped walnuts or almonds to cooked vegetables.  Try some vegetarian meals using beans and peas. Add garbanzo or kidney beans to salads. Make burritos and tacos with mashed cox beans or black beans.  Where can you learn more?  Go to https://www.[employer].net/patientEd and enter H967 to learn more about \"DASH Diet: Care Instructions.\"  Current as of: September 7, 2022               Content Version: 13.5  © 8950-9004 [employer].   Care instructions adapted under license by [employer]. If you have questions about a medical condition or this instruction, always ask your healthcare professional. [employer] disclaims any warranty or liability for your use of this information.

## 2024-11-18 NOTE — PROGRESS NOTES
HPI:  Nathaniel Cagle is a 69 y.o. male who presents today with   Chief Complaint   Patient presents with    Hypertension     6 month follow-up (Patient fasting)         History of Present Illness  The patient presents for evaluation of blood pressure.    He is compliant with his BP  meds;  He has not been taking his BP measurements at home lately.  When he was , his BP was better than the last BP reading obtained today.  ( 140/72)    He is currently on losartan/HCTZ, aspirin, fenofibrate, and an eyedrop. He reports feeling well overall, with no new health issues or diagnoses. He experiences occasional aches .    Pt is on fenofibrate for  hypertriglyceridemia .  No refills are needed    Last labs are noted below  Lab Results   Component Value Date    CHOL 212 (H) 05/15/2024    TRIG 114 05/15/2024    HDL 57 05/15/2024     (H) 05/15/2024    VLDL 23 05/15/2024    CHOLHDLRATIO 3.7 05/15/2024     Lab Results   Component Value Date     05/15/2024    K 5.6 (H) 05/15/2024     05/15/2024    CO2 28 05/15/2024    BUN 20 05/15/2024    CREATININE 1.2 05/15/2024    GLUCOSE 108 (H) 05/15/2024    CALCIUM 10.6 (H) 05/15/2024    BILITOT 0.5 05/15/2024    ALKPHOS 47 05/15/2024    AST 30 05/15/2024    ALT 22 05/15/2024    LABGLOM >60.0 05/15/2024    GFRAA >60.0 06/22/2021    AGRATIO 1.7 05/15/2024    GLOB 2.7 05/15/2024         Wt Readings from Last 3 Encounters:   11/18/24 86.8 kg (191 lb 6.4 oz)   06/20/24 89.8 kg (198 lb)   05/15/24 87.3 kg (192 lb 6.4 oz)            No data to display                  PMH,  Meds, Allergies, Family History, Social history reviewed      Current Outpatient Medications   Medication Sig Dispense Refill    losartan-hydroCHLOROthiazide (HYZAAR) 100-25 MG per tablet Take 1 tablet by mouth daily 90 tablet 3    fenofibrate (TRICOR) 145 MG tablet Take 1 tablet by mouth daily 90 tablet 3    aspirin 81 MG EC tablet Take 1 tablet by mouth daily      timolol (TIMOPTIC) 0.5 % ophthalmic solution

## 2024-11-19 LAB
A/G RATIO: 1.7 RATIO (ref 1.1–2.6)
ALBUMIN: 4.7 G/DL (ref 3.5–5)
ALP BLD-CCNC: 48 U/L (ref 40–125)
ALT SERPL-CCNC: 14 U/L (ref 5–40)
ANION GAP SERPL CALCULATED.3IONS-SCNC: 16 MMOL/L (ref 3–15)
AST SERPL-CCNC: 24 U/L (ref 10–37)
BILIRUB SERPL-MCNC: 0.6 MG/DL (ref 0.2–1.2)
BUN BLDV-MCNC: 21 MG/DL (ref 6–22)
CALCIUM SERPL-MCNC: 10.4 MG/DL (ref 8.4–10.5)
CHLORIDE BLD-SCNC: 98 MMOL/L (ref 98–110)
CHOLESTEROL, TOTAL: 232 MG/DL (ref 110–200)
CHOLESTEROL/HDL RATIO: 3.5 (ref 0–5)
CO2: 23 MMOL/L (ref 20–32)
CREAT SERPL-MCNC: 1.3 MG/DL (ref 0.8–1.6)
ESTIMATED AVERAGE GLUCOSE: 115 MG/DL (ref 91–123)
GFR, ESTIMATED: 58.3 ML/MIN/1.73 SQ.M.
GLOBULIN: 2.7 G/DL (ref 2–4)
GLUCOSE: 108 MG/DL (ref 70–99)
HBA1C MFR BLD: 5.6 % (ref 4.8–5.6)
HDLC SERPL-MCNC: 67 MG/DL
LDL CHOLESTEROL: 147 MG/DL (ref 50–99)
LDL/HDL RATIO: 2.2
NON-HDL CHOLESTEROL: 165 MG/DL
POTASSIUM SERPL-SCNC: 5.5 MMOL/L (ref 3.5–5.5)
SODIUM BLD-SCNC: 137 MMOL/L (ref 133–145)
TOTAL PROTEIN: 7.4 G/DL (ref 6.2–8.1)
TRIGL SERPL-MCNC: 89 MG/DL (ref 40–149)
TSH SERPL DL<=0.05 MIU/L-ACNC: 2.32 MCU/ML (ref 0.27–4.2)
VLDLC SERPL CALC-MCNC: 18 MG/DL (ref 8–30)

## 2025-05-19 ENCOUNTER — HOSPITAL ENCOUNTER (OUTPATIENT)
Facility: HOSPITAL | Age: 70
Setting detail: SPECIMEN
Discharge: HOME | End: 2025-05-22

## 2025-05-19 ENCOUNTER — OFFICE VISIT (OUTPATIENT)
Facility: CLINIC | Age: 70
End: 2025-05-19
Payer: COMMERCIAL

## 2025-05-19 VITALS
HEART RATE: 68 BPM | OXYGEN SATURATION: 96 % | WEIGHT: 192.6 LBS | SYSTOLIC BLOOD PRESSURE: 140 MMHG | TEMPERATURE: 97.7 F | RESPIRATION RATE: 16 BRPM | HEIGHT: 69 IN | DIASTOLIC BLOOD PRESSURE: 70 MMHG | BODY MASS INDEX: 28.53 KG/M2

## 2025-05-19 DIAGNOSIS — G89.29 CHRONIC PAIN OF BOTH SHOULDERS: ICD-10-CM

## 2025-05-19 DIAGNOSIS — I10 HTN (HYPERTENSION), BENIGN: ICD-10-CM

## 2025-05-19 DIAGNOSIS — M25.511 CHRONIC PAIN OF BOTH SHOULDERS: ICD-10-CM

## 2025-05-19 DIAGNOSIS — Z00.00 WELL ADULT EXAM: Primary | ICD-10-CM

## 2025-05-19 DIAGNOSIS — M25.512 CHRONIC PAIN OF BOTH SHOULDERS: ICD-10-CM

## 2025-05-19 DIAGNOSIS — N40.0 BENIGN PROSTATIC HYPERPLASIA WITHOUT LOWER URINARY TRACT SYMPTOMS: ICD-10-CM

## 2025-05-19 DIAGNOSIS — R73.09 OTHER ABNORMAL GLUCOSE: ICD-10-CM

## 2025-05-19 DIAGNOSIS — E78.1 HYPERTRIGLYCERIDEMIA: ICD-10-CM

## 2025-05-19 LAB
ESTIMATED AVERAGE GLUCOSE: 117 MG/DL (ref 91–123)
HBA1C MFR BLD: 5.7 % (ref 4.8–5.6)
SENTARA SPECIMEN COLLECTION: NORMAL

## 2025-05-19 PROCEDURE — 99397 PER PM REEVAL EST PAT 65+ YR: CPT | Performed by: FAMILY MEDICINE

## 2025-05-19 PROCEDURE — 3078F DIAST BP <80 MM HG: CPT | Performed by: FAMILY MEDICINE

## 2025-05-19 PROCEDURE — 99001 SPECIMEN HANDLING PT-LAB: CPT

## 2025-05-19 PROCEDURE — 3077F SYST BP >= 140 MM HG: CPT | Performed by: FAMILY MEDICINE

## 2025-05-19 SDOH — ECONOMIC STABILITY: FOOD INSECURITY: WITHIN THE PAST 12 MONTHS, YOU WORRIED THAT YOUR FOOD WOULD RUN OUT BEFORE YOU GOT MONEY TO BUY MORE.: NEVER TRUE

## 2025-05-19 SDOH — ECONOMIC STABILITY: FOOD INSECURITY: WITHIN THE PAST 12 MONTHS, THE FOOD YOU BOUGHT JUST DIDN'T LAST AND YOU DIDN'T HAVE MONEY TO GET MORE.: NEVER TRUE

## 2025-05-19 ASSESSMENT — PATIENT HEALTH QUESTIONNAIRE - PHQ9
SUM OF ALL RESPONSES TO PHQ QUESTIONS 1-9: 0
DEPRESSION UNABLE TO ASSESS: PT REFUSES
SUM OF ALL RESPONSES TO PHQ QUESTIONS 1-9: 0
2. FEELING DOWN, DEPRESSED OR HOPELESS: NOT AT ALL
1. LITTLE INTEREST OR PLEASURE IN DOING THINGS: NOT AT ALL
SUM OF ALL RESPONSES TO PHQ QUESTIONS 1-9: 0
SUM OF ALL RESPONSES TO PHQ QUESTIONS 1-9: 0

## 2025-05-19 NOTE — PATIENT INSTRUCTIONS
Well Visit, Over 65: Care Instructions  Well visits can help you stay healthy. Your doctor has checked your overall health and may have suggested ways to take good care of yourself. Your doctor also may have recommended tests. You can help prevent illness with healthy eating, good sleep, vaccinations, regular exercise, and other steps.    Get the tests that you and your doctor decide on. Depending on your age and risks, examples might include hearing tests as well as screening for colon, breast, and lung cancer. Screening helps find diseases before any symptoms appear.   Eat healthy foods. Choose fruits, vegetables, whole grains, lean protein, and low-fat dairy foods. Limit saturated fat, and reduce salt.     Limit alcohol. Men should have no more than 2 drinks a day. Women should have no more than 1. For some people, no alcohol is the best choice.   Exercise. It can help prevent falls. Get at least 30 minutes of exercise on most days of the week. Walking, yoga, and reanna chi can be good choices.     Reach and stay at your healthy weight. This will lower your risk for many health problems.   Take care of your mental health. Try to stay connected with friends, family, and community, and find ways to manage stress.     If you're feeling depressed or hopeless, talk to someone. A counselor can help. If you don't have a counselor, talk to your doctor.   Talk to your doctor if you think you may have a problem with alcohol or drug use. This includes prescription medicines and illegal drugs.     Avoid tobacco and nicotine: Don't smoke, vape, or chew. If you need help quitting, talk to your doctor.   Practice safer sex. Getting tested, using condoms or dental dams, and limiting sex partners can help prevent STIs.     Make an advance directive. This is a legal way to tell your family and doctor what you want to happen at the end of your life or when you can't speak for yourself.   Prevent problems where you can. Protect

## 2025-05-19 NOTE — PROGRESS NOTES
Well Adult Note  Name: Nathaniel Cagle Today’s Date: 2025   MRN: [id number] Sex: Male   Age: 70 y.o. Ethnicity: Declined   : 1955 Race: Black /       Nathaniel Cagle is here for a well adult exam.       Assessment & Plan   Well adult exam  HTN (hypertension), benign  -     Comprehensive Metabolic Panel; Future  -     Lipid Panel; Future  -     TSH; Future  Hypertriglyceridemia  -     Lipid Panel; Future  Other abnormal glucose  -     Hemoglobin A1C; Future  Benign prostatic hyperplasia without lower urinary tract symptoms  -     PSA, Total and Free; Future  -     Vitamin D 25 Hydroxy; Future  Chronic pain of both shoulders  -     Vitamin D 25 Hydroxy; Future      As above    Labs as ordered    AVS is accessible thru [employer] and pt has been advised of same.   This has been fully explained to the patient, who indicates understanding.      Additional discussion below:      Assessment & Plan  1. Bilateral shoulder pain.  - The etiology of the pain could be attributed to arthritis, as evidenced by the popping sound on the left side and the grinding sensation on the right side during movement.  - Physical exam findings include the presence of popping and grinding sensations in both shoulders.  - Discussed incorporating physical activities outside of work into his routine to strengthen the muscles around the joint. Exercises will be provided via [employer].  - Advised to continue taking Tylenol as needed for severe pain. If the pain persists, further interventions such as x-rays, formal physical therapy, or injections into the shoulders may be considered.    2. Elevated blood pressure.  - Blood pressure readings have been slightly elevated today.  - Current blood pressure reading is 140/70.  - Advised to monitor blood pressure at home and ensure it remains below 140/90. If readings consistently exceed 150/90, he should inform us for potential adjustment of therapy or additional medication.  - Plan to

## 2025-05-19 NOTE — PROGRESS NOTES
Have you been to the ER, urgent care clinic since your last visit?  Hospitalized since your last visit?   NO    Have you seen or consulted any other health care providers outside our system since your last visit?   NO    “Have you had a diabetic eye exam?”    YES - Where: 05/16/2025- Ophthalmology- Dr. SAMUEL Palomares Nurse/ISAIAS to request most recent records if not in the chart     No diabetic eye exam on file

## 2025-05-20 LAB
% FREE PSA: 28 %
A/G RATIO: 1.7 RATIO (ref 1.1–2.6)
ALBUMIN: 4.7 G/DL (ref 3.5–5)
ALP BLD-CCNC: 49 U/L (ref 40–125)
ALT SERPL-CCNC: 22 U/L (ref 5–40)
ANION GAP SERPL CALCULATED.3IONS-SCNC: 14 MMOL/L (ref 3–15)
AST SERPL-CCNC: 30 U/L (ref 10–37)
BILIRUB SERPL-MCNC: 0.4 MG/DL (ref 0.2–1.2)
BUN BLDV-MCNC: 21 MG/DL (ref 6–22)
CALCIUM SERPL-MCNC: 10.7 MG/DL (ref 8.4–10.5)
CHLORIDE BLD-SCNC: 95 MMOL/L (ref 98–110)
CHOLESTEROL, TOTAL: 214 MG/DL (ref 110–200)
CHOLESTEROL/HDL RATIO: 3.8 (ref 0–5)
CO2: 25 MMOL/L (ref 20–32)
CREAT SERPL-MCNC: 1.3 MG/DL (ref 0.8–1.6)
GFR, ESTIMATED: 57.9 ML/MIN/1.73 SQ.M.
GLOBULIN: 2.7 G/DL (ref 2–4)
GLUCOSE: 109 MG/DL (ref 70–99)
HDLC SERPL-MCNC: 56 MG/DL
LDL CHOLESTEROL: 125 MG/DL (ref 50–99)
LDL/HDL RATIO: 2.2
NON-HDL CHOLESTEROL: 158 MG/DL
POTASSIUM SERPL-SCNC: 5.4 MMOL/L (ref 3.5–5.5)
PROSTATE SPECIFIC ANTIGEN FREE: 0.19 NG/ML
PROSTATE SPECIFIC ANTIGEN: 0.67 NG/ML
SODIUM BLD-SCNC: 134 MMOL/L (ref 133–145)
TOTAL PROTEIN: 7.4 G/DL (ref 6.2–8.1)
TRIGL SERPL-MCNC: 162 MG/DL (ref 40–149)
TSH SERPL DL<=0.05 MIU/L-ACNC: 1.74 MCU/ML (ref 0.27–4.2)
VITAMIN D 25-HYDROXY: 54.3 NG/ML (ref 32–100)
VLDLC SERPL CALC-MCNC: 32 MG/DL (ref 8–30)

## 2025-08-11 RX ORDER — LOSARTAN POTASSIUM AND HYDROCHLOROTHIAZIDE 25; 100 MG/1; MG/1
1 TABLET ORAL DAILY
Qty: 90 TABLET | Refills: 3 | Status: CANCELLED | OUTPATIENT
Start: 2025-08-11

## 2025-08-11 RX ORDER — FENOFIBRATE 145 MG/1
145 TABLET, FILM COATED ORAL DAILY
Qty: 90 TABLET | Refills: 3 | Status: CANCELLED | OUTPATIENT
Start: 2025-08-11